# Patient Record
Sex: FEMALE | Race: WHITE | NOT HISPANIC OR LATINO | Employment: OTHER | URBAN - METROPOLITAN AREA
[De-identification: names, ages, dates, MRNs, and addresses within clinical notes are randomized per-mention and may not be internally consistent; named-entity substitution may affect disease eponyms.]

---

## 2014-12-24 LAB — CRC RECOMMENDATION EXT: NORMAL

## 2017-01-17 DIAGNOSIS — Z79.890 HORMONE REPLACEMENT THERAPY (HRT): ICD-10-CM

## 2017-01-19 RX ORDER — ESTRADIOL 0.5 MG/1
0.5 TABLET ORAL DAILY
Qty: 30 TABLET | Refills: 5 | Status: SHIPPED | OUTPATIENT
Start: 2017-01-19 | End: 2017-06-05 | Stop reason: SDUPTHER

## 2017-06-05 DIAGNOSIS — Z79.890 HORMONE REPLACEMENT THERAPY (HRT): ICD-10-CM

## 2017-06-06 RX ORDER — ESTRADIOL 0.5 MG/1
0.5 TABLET ORAL DAILY
Qty: 30 TABLET | Refills: 0 | Status: SHIPPED | OUTPATIENT
Start: 2017-06-06 | End: 2017-06-30 | Stop reason: SDUPTHER

## 2017-06-06 NOTE — TELEPHONE ENCOUNTER
Vicente pt requesting refill of estradiol. Due for her annual appt this month. Allergies and pharmacy are up to date.

## 2017-06-30 ENCOUNTER — TELEPHONE (OUTPATIENT)
Dept: OBSTETRICS AND GYNECOLOGY | Facility: CLINIC | Age: 67
End: 2017-06-30

## 2017-06-30 DIAGNOSIS — Z79.890 HORMONE REPLACEMENT THERAPY (HRT): ICD-10-CM

## 2017-06-30 DIAGNOSIS — Z12.31 ENCOUNTER FOR SCREENING MAMMOGRAM FOR MALIGNANT NEOPLASM OF BREAST: Primary | ICD-10-CM

## 2017-06-30 RX ORDER — ESTRADIOL 0.5 MG/1
TABLET ORAL
Qty: 30 TABLET | Refills: 0 | Status: SHIPPED | OUTPATIENT
Start: 2017-06-30 | End: 2018-04-12 | Stop reason: SDUPTHER

## 2018-04-12 DIAGNOSIS — Z79.890 HORMONE REPLACEMENT THERAPY (HRT): ICD-10-CM

## 2018-04-12 RX ORDER — ESTRADIOL 0.5 MG/1
TABLET ORAL
Qty: 30 TABLET | Refills: 1 | Status: SHIPPED | OUTPATIENT
Start: 2018-04-12 | End: 2018-06-07 | Stop reason: SDUPTHER

## 2018-06-07 ENCOUNTER — OFFICE VISIT (OUTPATIENT)
Dept: OBSTETRICS AND GYNECOLOGY | Facility: CLINIC | Age: 68
End: 2018-06-07
Payer: MEDICARE

## 2018-06-07 ENCOUNTER — APPOINTMENT (OUTPATIENT)
Dept: RADIOLOGY | Facility: OTHER | Age: 68
End: 2018-06-07
Attending: NURSE PRACTITIONER
Payer: MEDICARE

## 2018-06-07 VITALS
DIASTOLIC BLOOD PRESSURE: 72 MMHG | HEIGHT: 66 IN | WEIGHT: 166.56 LBS | BODY MASS INDEX: 26.77 KG/M2 | SYSTOLIC BLOOD PRESSURE: 120 MMHG

## 2018-06-07 DIAGNOSIS — Z12.39 SCREENING FOR BREAST CANCER: Primary | ICD-10-CM

## 2018-06-07 DIAGNOSIS — M85.80 OSTEOPENIA, UNSPECIFIED LOCATION: ICD-10-CM

## 2018-06-07 DIAGNOSIS — Z79.890 POSTMENOPAUSAL HRT (HORMONE REPLACEMENT THERAPY): ICD-10-CM

## 2018-06-07 DIAGNOSIS — M89.9 DISORDER OF BONE: ICD-10-CM

## 2018-06-07 DIAGNOSIS — Z79.890 HORMONE REPLACEMENT THERAPY (HRT): ICD-10-CM

## 2018-06-07 DIAGNOSIS — Z01.419 ENCOUNTER FOR GYNECOLOGICAL EXAMINATION: ICD-10-CM

## 2018-06-07 DIAGNOSIS — Z12.39 SCREENING FOR BREAST CANCER: ICD-10-CM

## 2018-06-07 PROCEDURE — 99999 PR PBB SHADOW E&M-EST. PATIENT-LVL IV: CPT | Mod: PBBFAC,,, | Performed by: NURSE PRACTITIONER

## 2018-06-07 PROCEDURE — 77067 SCR MAMMO BI INCL CAD: CPT | Mod: TC,PN

## 2018-06-07 PROCEDURE — G0101 CA SCREEN;PELVIC/BREAST EXAM: HCPCS | Mod: S$GLB,,, | Performed by: NURSE PRACTITIONER

## 2018-06-07 PROCEDURE — 77067 SCR MAMMO BI INCL CAD: CPT | Mod: 26,,, | Performed by: RADIOLOGY

## 2018-06-07 PROCEDURE — 77063 BREAST TOMOSYNTHESIS BI: CPT | Mod: 26,,, | Performed by: RADIOLOGY

## 2018-06-07 RX ORDER — ESTRADIOL 0.5 MG/1
TABLET ORAL
Qty: 30 TABLET | Refills: 11 | Status: SHIPPED | OUTPATIENT
Start: 2018-06-07 | End: 2018-12-28 | Stop reason: SDUPTHER

## 2018-06-07 NOTE — PROGRESS NOTES
Chief Complaint: Well Woman Exam    (Dr. Zhang patient)    Last Pap:  No result found HYST    Last Mammo:   (nml)  Last DEXA:  2014  (Left hip:  -1.5, spine not found in legacy documents)  Last Colonoscopy:  < 5yrs ago (nml per pt)    PCP:   at [facility]     Patient ID: Macy Mackay is a 67 y.o. female.    Chief Complaint:  Well Woman (Dr Zhang  hysterectomy  Last mammo  neg dexa  neg)      History of Present Illness.  Macy Mackay is a 67 y.o. female.  She has no breast or urinary symptoms.  She has no postcoital bleeding, pelvic pain or vaginal discharge.  Ms. Mackay is not currently sexually active.   States she had back surgery in 2016 after she fell and broke her back (T-11), and was told she had osteoporosis of spine by her surgeon.    GYN & OB History  No LMP recorded. Patient has had a hysterectomy.     OB History    Para Term  AB Living   6       3 3   SAB TAB Ectopic Multiple Live Births   3              # Outcome Date GA Lbr Antonino/2nd Weight Sex Delivery Anes PTL Lv   6             5             4             3 SAB            2 SAB            1 SAB                   Past Medical History:   Diagnosis Date    Anxiety     Breast disorder     Depression     Diabetes mellitus     Hyperlipidemia     Leiomyoma     Menopause     Osteopenia     Osteoporosis     in t section      Past Surgical History:   Procedure Laterality Date    ABDOMINAL SURGERY      gastric sleeve then gastric bypass then stomach reconstruction (after complications)    BREAST SURGERY      HYSTERECTOMY      neck and back surgeries      OOPHORECTOMY       Family History   Problem Relation Age of Onset    Eclampsia Maternal Grandmother     Diabetes Mother     Hypertension Mother     Stroke Mother     Diabetes Brother     Diabetes Sister     Stroke Maternal Uncle     Breast cancer Neg Hx     Colon cancer Neg Hx     Ovarian cancer Neg Hx      Social  "History   Substance Use Topics    Smoking status: Former Smoker    Smokeless tobacco: Never Used    Alcohol use No       Current Outpatient Prescriptions:     BD LUER-ROSA SYRINGE 3 mL 25 gauge x 1" Syrg, , Disp: , Rfl:     cyanocobalamin 1,000 mcg/mL injection, , Disp: , Rfl:     duloxetine (CYMBALTA) 60 MG capsule, , Disp: , Rfl:     DULOXETINE HCL (CYMBALTA ORAL), once a day, Disp: , Rfl:     estradiol (ESTRACE) 0.5 MG tablet, TAKE 1 TABLET ONE TIME DAILY, Disp: 30 tablet, Rfl: 11    ondansetron (ZOFRAN-ODT) 4 MG TbDL, , Disp: , Rfl:     oxycodone (ROXICODONE) 15 MG Tab, , Disp: , Rfl:     tretinoin (RETIN-A) 0.025 % cream, 1 application every evening. Apply to face, Disp: , Rfl: 3    Review of patient's allergies indicates:   Allergen Reactions    Demerol [meperidine] Hives       Review of Systems  GENERAL: Denies unintentional weight gain or weight loss. Feeling well overall.   SKIN: Denies rash or lesions.   HEENT: Denies headaches, or vision changes.   CARDIOVASCULAR: Denies palpitations or chest pain.   RESPIRATORY: Denies shortness of breath or dyspnea on exertion.  BREASTS: Denies pain, lumps, or nipple discharge.   ABDOMEN: Denies abdominal pain, constipation, diarrhea, nausea, vomiting, change in appetite.  URINARY: Denies frequency, dysuria, hematuria.  NEUROLOGIC: Denies syncope or weakness.   PSYCHIATRIC: Denies depression, anxiety or mood swings.  VULVAR: No pain, no lesions and no itching.  VAGINAL: No relaxation, no itching, no abnormal bleeding and no lesions.    Objective:     PHYSICAL EXAM:  /72   Ht 5' 6" (1.676 m)   Wt 75.5 kg (166 lb 8.9 oz)   BMI 26.88 kg/m²   Body mass index is 26.88 kg/m².     APPEARANCE: Well nourished, well developed, in no acute distress.  PSYCH: Appropriate mood and affect.  SKIN: No acne or hirsutism.  NECK: Neck symmetric without masses or thyromegaly  NODES: No inguinal, axillary, or supraclavicular lymph node enlargement  CHEST: Normal " respiratory effort.  ABDOMEN: Soft.  No tenderness or masses.    BREASTS: Symmetrical, no skin changes or visible lesions.  No palpable masses or nipple discharge bilaterally.  PELVIC: External genitalia: atrophic external genitalia, no erythema, no discharge  and urethra: normal appearing urethra with no masses, tenderness or lesions.  Normal hair distribution.  Vagina normal appearing vagina with normal color and discharge, no lesions, atrophic.  Cervix surgically absent.  No significant cystocele or rectocele.  Bimanual exam shows uterus to be surgically absent, vaginal cuff well healed.  Adnexa normal adnexa and no mass, fullness, tenderness.    EXTREMITIES: No edema.        Assessment/ Plan:     Screening for breast cancer  -     Cancel: Mammo Digital Screening Bilat with Tomosynthesis CAD; Future; Expected date: 06/07/2018  -     Mammo Digital Screening Bilat with Tomosynthesis CAD; Future; Expected date: 06/07/2018    Encounter for gynecological examination    Osteopenia, unspecified location  -     DXA Bone Density Spine And Hip; Future; Expected date: 06/07/2018    Disorder of bone   -     DXA Bone Density Spine And Hip; Future; Expected date: 06/07/2018    Postmenopausal HRT (hormone replacement therapy)    Hormone replacement therapy (HRT)  -     estradiol (ESTRACE) 0.5 MG tablet; TAKE 1 TABLET ONE TIME DAILY  Dispense: 30 tablet; Refill: 11        Patient was counseled today on current ASCCP pap guidelines, the recommendation for yearly pelvic exams, healthy diet and exercise routines, annual mammograms and breast self awareness, and colonoscopy screening. She is to see her PCP for other health maintenance, including yearly influenza vaccination.    Recommend calcium 1200 mg and vitamin D 600 units daily and routine bone mineral density testing every 2 years.    Use of the [facility] Patient Portal discussed and encouraged during today's visit.     Follow-up in about 1 year (around 6/7/2019) for Annual.

## 2018-06-08 ENCOUNTER — TELEPHONE (OUTPATIENT)
Dept: OBSTETRICS AND GYNECOLOGY | Facility: CLINIC | Age: 68
End: 2018-06-08

## 2018-06-08 NOTE — TELEPHONE ENCOUNTER
----- Message from Letty Green NP sent at 6/8/2018  7:01 AM CDT -----  Call patient mammogram normal

## 2018-06-15 ENCOUNTER — HOSPITAL ENCOUNTER (OUTPATIENT)
Dept: RADIOLOGY | Facility: CLINIC | Age: 68
Discharge: HOME | End: 2018-06-15
Attending: NURSE PRACTITIONER
Payer: MEDICARE

## 2018-06-15 DIAGNOSIS — M89.9 DISORDER OF BONE: ICD-10-CM

## 2018-06-15 DIAGNOSIS — M85.80 OSTEOPENIA, UNSPECIFIED LOCATION: ICD-10-CM

## 2018-06-15 PROCEDURE — 77080 DXA BONE DENSITY AXIAL: CPT | Mod: TC

## 2018-06-15 PROCEDURE — 77080 DXA BONE DENSITY AXIAL: CPT | Mod: 26,,, | Performed by: INTERNAL MEDICINE

## 2018-07-11 NOTE — PROGRESS NOTES
Hey - this patient of yours had WWE and DEXA done recently.  She reported a h/o Osteoporosis after repair of her spine (due to a fall). She is on Estrace PO daily, and the results show Osteopenia   (FRAX: 3.9 % and 19 %).  Is there anything you'd like to do differently to manage this?

## 2018-12-28 DIAGNOSIS — Z79.890 HORMONE REPLACEMENT THERAPY (HRT): ICD-10-CM

## 2018-12-28 RX ORDER — ESTRADIOL 0.5 MG/1
TABLET ORAL
Qty: 90 TABLET | Refills: 3 | Status: SHIPPED | OUTPATIENT
Start: 2018-12-28 | End: 2019-05-27 | Stop reason: SDUPTHER

## 2019-05-27 DIAGNOSIS — Z79.890 HORMONE REPLACEMENT THERAPY (HRT): ICD-10-CM

## 2019-06-03 RX ORDER — ESTRADIOL 0.5 MG/1
TABLET ORAL
Qty: 90 TABLET | Refills: 3 | Status: SHIPPED | OUTPATIENT
Start: 2019-06-03 | End: 2019-08-02 | Stop reason: SDUPTHER

## 2019-08-02 ENCOUNTER — OFFICE VISIT (OUTPATIENT)
Dept: OBSTETRICS AND GYNECOLOGY | Facility: CLINIC | Age: 69
End: 2019-08-02
Payer: MEDICARE

## 2019-08-02 VITALS
SYSTOLIC BLOOD PRESSURE: 120 MMHG | HEIGHT: 66 IN | WEIGHT: 149.69 LBS | BODY MASS INDEX: 24.06 KG/M2 | DIASTOLIC BLOOD PRESSURE: 60 MMHG

## 2019-08-02 DIAGNOSIS — Z79.890 HORMONE REPLACEMENT THERAPY (HRT): ICD-10-CM

## 2019-08-02 DIAGNOSIS — Z01.419 ENCOUNTER FOR GYNECOLOGICAL EXAMINATION: Primary | ICD-10-CM

## 2019-08-02 DIAGNOSIS — Z12.31 ENCOUNTER FOR SCREENING MAMMOGRAM FOR MALIGNANT NEOPLASM OF BREAST: ICD-10-CM

## 2019-08-02 PROCEDURE — 99999 PR PBB SHADOW E&M-EST. PATIENT-LVL III: CPT | Mod: PBBFAC,,, | Performed by: OBSTETRICS & GYNECOLOGY

## 2019-08-02 PROCEDURE — 99999 PR PBB SHADOW E&M-EST. PATIENT-LVL III: ICD-10-PCS | Mod: PBBFAC,,, | Performed by: OBSTETRICS & GYNECOLOGY

## 2019-08-02 PROCEDURE — G0101 PR CA SCREEN;PELVIC/BREAST EXAM: ICD-10-PCS | Mod: S$GLB,,, | Performed by: OBSTETRICS & GYNECOLOGY

## 2019-08-02 PROCEDURE — G0101 CA SCREEN;PELVIC/BREAST EXAM: HCPCS | Mod: S$GLB,,, | Performed by: OBSTETRICS & GYNECOLOGY

## 2019-08-02 RX ORDER — QUININE SULFATE 324 MG/1
CAPSULE ORAL
Refills: 6 | COMMUNITY
Start: 2019-07-26 | End: 2023-01-30 | Stop reason: SDUPTHER

## 2019-08-02 RX ORDER — FENTANYL 50 UG/1
PATCH TRANSDERMAL
Refills: 0 | COMMUNITY
Start: 2019-06-29

## 2019-08-02 RX ORDER — PREGABALIN 75 MG/1
75 CAPSULE ORAL 2 TIMES DAILY
Refills: 1 | COMMUNITY
Start: 2019-05-20 | End: 2022-09-19

## 2019-08-02 RX ORDER — LANOLIN ALCOHOL/MO/W.PET/CERES
1 CREAM (GRAM) TOPICAL DAILY
Refills: 6 | COMMUNITY
Start: 2019-05-12 | End: 2022-09-19

## 2019-08-02 RX ORDER — DULOXETIN HYDROCHLORIDE 30 MG/1
30 CAPSULE, DELAYED RELEASE ORAL NIGHTLY
Refills: 2 | COMMUNITY
Start: 2019-05-24 | End: 2022-09-19

## 2019-08-02 RX ORDER — BACLOFEN 10 MG/1
10 TABLET ORAL 3 TIMES DAILY
Refills: 1 | COMMUNITY
Start: 2019-07-24

## 2019-08-02 RX ORDER — ESTRADIOL 0.5 MG/1
TABLET ORAL
Qty: 90 TABLET | Refills: 3 | Status: SHIPPED | OUTPATIENT
Start: 2019-08-02 | End: 2023-11-06 | Stop reason: SDUPTHER

## 2019-08-02 NOTE — PROGRESS NOTES
"Subjective:       Patient ID: Macy Mackay is a 68 y.o. female.    Chief Complaint:  Well Woman (mammo 6/2018 bmd 6/2018 )      History of Present Illness  - here for annual. Has lump on left side of labia that thinks has gotten a little bigger. Doesn't hurt. No drainage. Doing well on HRT and wishes to continue.    Past Medical History:   Diagnosis Date    Anxiety     Breast disorder     Depression     Diabetes mellitus     Hyperlipidemia     Leiomyoma     Menopause     Osteopenia     Osteoporosis     in t section        Past Surgical History:   Procedure Laterality Date    ABDOMINAL SURGERY      gastric sleeve then gastric bypass then stomach reconstruction (after complications)    BREAST SURGERY      removed implants    HYSTERECTOMY      neck and back surgeries      OOPHORECTOMY           Current Outpatient Medications:     baclofen (LIORESAL) 10 MG tablet, Take 10 mg by mouth 3 (three) times daily., Disp: , Rfl: 1    BD LUER-ROSA SYRINGE 3 mL 25 gauge x 1" Syrg, , Disp: , Rfl:     cyanocobalamin 1,000 mcg/mL injection, , Disp: , Rfl:     DULoxetine (CYMBALTA) 30 MG capsule, Take 30 mg by mouth every evening., Disp: , Rfl: 2    duloxetine (CYMBALTA) 60 MG capsule, , Disp: , Rfl:     estradiol (ESTRACE) 0.5 MG tablet, TAKE 1 TABLET ONE TIME DAILY, Disp: 90 tablet, Rfl: 3    fentaNYL (DURAGESIC) 50 mcg/hr, APPLY 1 PATCH TRANSDERMALLY EVERY 72 HOURS, Disp: , Rfl: 0    LYRICA 75 mg capsule, Take 75 mg by mouth 2 (two) times daily., Disp: , Rfl: 1    magnesium oxide (MAG-OX) 400 mg (241.3 mg magnesium) tablet, Take 1 tablet by mouth once daily., Disp: , Rfl: 6    ondansetron (ZOFRAN-ODT) 4 MG TbDL, , Disp: , Rfl:     oxycodone (ROXICODONE) 15 MG Tab, , Disp: , Rfl:     quiNINE sulfate 324 mg Cap, TAKE 1 CAPSULE BY MOUTH EVERYDAY AT BEDTIME, Disp: , Rfl: 6    tretinoin (RETIN-A) 0.025 % cream, 1 application every evening. Apply to face, Disp: , Rfl: 3    Review of patient's allergies " indicates:   Allergen Reactions    Demerol [meperidine] Hives       GYN & OB History  No LMP recorded. Patient has had a hysterectomy.   Date of Last Pap: No result found    OB History    Para Term  AB Living   6 3 3   3 3   SAB TAB Ectopic Multiple Live Births   3       3      # Outcome Date GA Lbr Antonino/2nd Weight Sex Delivery Anes PTL Lv   6 Term      Vag-Spont   ALINA   5 Term      Vag-Spont   ALINA   4 Term      Vag-Spont   ALINA   3 SAB            2 SAB            1 SAB                Social History     Socioeconomic History    Marital status:      Spouse name: Not on file    Number of children: Not on file    Years of education: Not on file    Highest education level: Not on file   Occupational History    Not on file   Social Needs    Financial resource strain: Not on file    Food insecurity:     Worry: Not on file     Inability: Not on file    Transportation needs:     Medical: Not on file     Non-medical: Not on file   Tobacco Use    Smoking status: Former Smoker    Smokeless tobacco: Never Used   Substance and Sexual Activity    Alcohol use: No    Drug use: No    Sexual activity: Yes     Partners: Male     Birth control/protection: See Surgical Hx   Lifestyle    Physical activity:     Days per week: Not on file     Minutes per session: Not on file    Stress: Not on file   Relationships    Social connections:     Talks on phone: Not on file     Gets together: Not on file     Attends Zoroastrian service: Not on file     Active member of club or organization: Not on file     Attends meetings of clubs or organizations: Not on file     Relationship status: Not on file   Other Topics Concern    Not on file   Social History Narrative    Not on file       Family History   Problem Relation Age of Onset    Eclampsia Maternal Grandmother     Diabetes Mother     Hypertension Mother     Stroke Mother     Diabetes Brother     Diabetes Sister     Stroke Maternal Uncle     Breast  "cancer Neg Hx     Colon cancer Neg Hx     Ovarian cancer Neg Hx        Review of Systems  Review of Systems   Respiratory: Negative for shortness of breath.    Cardiovascular: Negative for chest pain and palpitations.   Gastrointestinal: Negative for blood in stool, nausea and vomiting.   Genitourinary:        - see HPI   Skin: Negative for rash and wound.   Allergic/Immunologic: Negative for immunocompromised state.   Neurological: Negative for dizziness and syncope.   Hematological: Negative for adenopathy.   Psychiatric/Behavioral: Negative for behavioral problems.        Objective:     Vitals:    08/02/19 1149   BP: 120/60   Weight: 67.9 kg (149 lb 11.1 oz)   Height: 5' 6" (1.676 m)       Physical Exam:   Constitutional: She is oriented to person, place, and time. She appears well-developed and well-nourished.        Pulmonary/Chest: Right breast exhibits no mass, no nipple discharge, no skin change, no tenderness and no swelling. Left breast exhibits no mass, no nipple discharge, no skin change, no tenderness and no swelling. Breasts are symmetrical.        Abdominal: Soft. She exhibits no distension. There is no tenderness.     Genitourinary: Vagina normal.       There is no tenderness or lesion on the right labia. There is no tenderness or lesion on the left labia. Uterus is absent. Right adnexum displays no mass, no tenderness and no fullness. Left adnexum displays no mass, no tenderness and no fullness. No vaginal discharge found. Cervix exhibits absence.   Genitourinary Comments: Tiny uninfected sebaceous cyst to left of clitoral estes. No s/s infection.           Musculoskeletal: Moves all extremeties.       Neurological: She is alert and oriented to person, place, and time.     Psychiatric: She has a normal mood and affect.        Assessment/ Plan:     Orders Placed This Encounter    Mammo Digital Screening Bilat w/ Alexander    estradiol (ESTRACE) 0.5 MG tablet       Macy was seen today for well " woman.    Diagnoses and all orders for this visit:    Encounter for gynecological examination    Encounter for screening mammogram for malignant neoplasm of breast  -     Mammo Digital Screening Bilat w/ Alexander; Future    Hormone replacement therapy (HRT)  -     estradiol (ESTRACE) 0.5 MG tablet; TAKE 1 TABLET ONE TIME DAILY    - reassured: no treatment needed for sebaceous cyst.    Follow up in about 2 years (around 8/2/2021).

## 2019-09-19 ENCOUNTER — TELEPHONE (OUTPATIENT)
Dept: OBSTETRICS AND GYNECOLOGY | Facility: CLINIC | Age: 69
End: 2019-09-19

## 2019-09-19 NOTE — TELEPHONE ENCOUNTER
Pt reports a discharge.  She doesn't know if she has a yeast infection or BV but just finished abx.  Denies itching.  Recommended an appt scheduled next Thursday with Letty.

## 2019-09-19 NOTE — TELEPHONE ENCOUNTER
Dr Zhang pt calling, states she has a vaginal discharge and would like something sent in.Pt # [phone] [facility] # [phone]

## 2019-09-26 ENCOUNTER — OFFICE VISIT (OUTPATIENT)
Dept: OBSTETRICS AND GYNECOLOGY | Facility: CLINIC | Age: 69
End: 2019-09-26
Payer: MEDICARE

## 2019-09-26 VITALS
BODY MASS INDEX: 24.84 KG/M2 | WEIGHT: 154.56 LBS | SYSTOLIC BLOOD PRESSURE: 112 MMHG | DIASTOLIC BLOOD PRESSURE: 72 MMHG | HEIGHT: 66 IN

## 2019-09-26 DIAGNOSIS — N76.0 BV (BACTERIAL VAGINOSIS): ICD-10-CM

## 2019-09-26 DIAGNOSIS — B96.89 BV (BACTERIAL VAGINOSIS): ICD-10-CM

## 2019-09-26 DIAGNOSIS — N89.8 VAGINAL DISCHARGE: Primary | ICD-10-CM

## 2019-09-26 PROBLEM — E88.810 METABOLIC SYNDROME: Status: ACTIVE | Noted: 2019-09-26

## 2019-09-26 PROBLEM — M72.0 DUPUYTREN'S DISEASE OF PALM: Status: ACTIVE | Noted: 2019-09-26

## 2019-09-26 PROBLEM — M51.9 LUMBAR DISC DISEASE: Status: ACTIVE | Noted: 2019-09-26

## 2019-09-26 PROBLEM — E11.9 TYPE 2 DIABETES MELLITUS: Status: ACTIVE | Noted: 2019-09-26

## 2019-09-26 PROBLEM — E78.9 LIPID DISORDER: Status: ACTIVE | Noted: 2019-09-26

## 2019-09-26 LAB
BACTERIA HYPHAE, POC: POSITIVE
GARDNERELLA VAGINALIS: NEGATIVE
OTHER MICROSC. OBSERVATIONS: NEGATIVE
POC BACTERIAL VAGINOSIS: NEGATIVE
POC CLUE CELLS: NEGATIVE
TRICHOMONAS, POC: NEGATIVE
YEAST WET PREP: NEGATIVE
YEAST, POC: NEGATIVE

## 2019-09-26 PROCEDURE — 87220 PR  TISSUE EXAM BY KOH: ICD-10-PCS | Mod: S$GLB,,, | Performed by: NURSE PRACTITIONER

## 2019-09-26 PROCEDURE — 99214 OFFICE O/P EST MOD 30 MIN: CPT | Mod: S$GLB,,, | Performed by: NURSE PRACTITIONER

## 2019-09-26 PROCEDURE — 99999 PR PBB SHADOW E&M-EST. PATIENT-LVL IV: ICD-10-PCS | Mod: PBBFAC,,, | Performed by: NURSE PRACTITIONER

## 2019-09-26 PROCEDURE — 87210 PR  SMEAR,STAIN,WET MNT,INTERP: ICD-10-PCS | Mod: QW,S$GLB,, | Performed by: NURSE PRACTITIONER

## 2019-09-26 PROCEDURE — 87220 TISSUE EXAM FOR FUNGI: CPT | Mod: S$GLB,,, | Performed by: NURSE PRACTITIONER

## 2019-09-26 PROCEDURE — 99999 PR PBB SHADOW E&M-EST. PATIENT-LVL IV: CPT | Mod: PBBFAC,,, | Performed by: NURSE PRACTITIONER

## 2019-09-26 PROCEDURE — 1101F PR PT FALLS ASSESS DOC 0-1 FALLS W/OUT INJ PAST YR: ICD-10-PCS | Mod: CPTII,S$GLB,, | Performed by: NURSE PRACTITIONER

## 2019-09-26 PROCEDURE — 1101F PT FALLS ASSESS-DOCD LE1/YR: CPT | Mod: CPTII,S$GLB,, | Performed by: NURSE PRACTITIONER

## 2019-09-26 PROCEDURE — 99214 PR OFFICE/OUTPT VISIT, EST, LEVL IV, 30-39 MIN: ICD-10-PCS | Mod: S$GLB,,, | Performed by: NURSE PRACTITIONER

## 2019-09-26 PROCEDURE — 87210 SMEAR WET MOUNT SALINE/INK: CPT | Mod: QW,S$GLB,, | Performed by: NURSE PRACTITIONER

## 2019-09-26 RX ORDER — AMOXICILLIN AND CLAVULANATE POTASSIUM 875; 125 MG/1; MG/1
TABLET, FILM COATED ORAL
Refills: 0 | COMMUNITY
Start: 2019-09-09 | End: 2019-09-26

## 2019-09-26 RX ORDER — IBUPROFEN 800 MG/1
800 TABLET ORAL EVERY 6 HOURS PRN
Refills: 0 | COMMUNITY
Start: 2019-09-03

## 2019-09-26 RX ORDER — HYDROCODONE BITARTRATE 15 MG/1
CAPSULE, EXTENDED RELEASE ORAL
COMMUNITY
Start: 2019-09-06 | End: 2022-09-19

## 2019-09-26 RX ORDER — ATORVASTATIN CALCIUM 40 MG/1
40 TABLET, FILM COATED ORAL
COMMUNITY
Start: 2019-09-06 | End: 2019-09-26

## 2019-09-26 RX ORDER — PENICILLIN V POTASSIUM 500 MG/1
TABLET, FILM COATED ORAL
Refills: 0 | COMMUNITY
Start: 2019-08-28 | End: 2019-09-26

## 2019-09-26 RX ORDER — ATORVASTATIN CALCIUM 40 MG/1
40 TABLET, FILM COATED ORAL NIGHTLY
Refills: 6 | COMMUNITY
Start: 2019-08-16 | End: 2022-09-19

## 2019-09-26 RX ORDER — METRONIDAZOLE 65 MG/5G
1 GEL TOPICAL NIGHTLY
Qty: 5 G | Refills: 0 | Status: SHIPPED | OUTPATIENT
Start: 2019-09-26 | End: 2019-09-27

## 2019-09-26 RX ORDER — PREDNISONE 10 MG/1
TABLET ORAL
Refills: 0 | COMMUNITY
Start: 2019-09-20 | End: 2022-09-19 | Stop reason: ALTCHOICE

## 2019-09-26 RX ORDER — ACETAMINOPHEN 500 MG
5 TABLET ORAL
COMMUNITY
Start: 2015-04-27

## 2019-09-27 NOTE — PROGRESS NOTES
"Chief Complaint:    Chief Complaint   Patient presents with    Vaginal Discharge     pt has been on antibiotics for several months to treat urinary symptoms         (Dr. Zhang patient)    Last Pap:   No result found      HPI:     Macy Mackay is a 69 y.o. female  presents with complaint of yellow vaginal discharge for past 1-2 months; denies odor or itch.   States she has been under the care of her urologist for cystitis, and was on oral antibiotics for several months.      She declines STD screening today.    LMP:  No LMP recorded. Patient has had a hysterectomy.    Past Medical History:   Diagnosis Date    Anxiety     Breast disorder     Depression     Diabetes mellitus     Hyperlipidemia     Leiomyoma     Menopause     Osteopenia     Osteoporosis     in t section        Past Surgical History:   Procedure Laterality Date    ABDOMINAL SURGERY      gastric sleeve then gastric bypass then stomach reconstruction (after complications)    BREAST SURGERY      removed implants    HYSTERECTOMY      neck and back surgeries      OOPHORECTOMY         OB History    Para Term  AB Living   6 3 3   3 3   SAB TAB Ectopic Multiple Live Births   3       3      # Outcome Date GA Lbr Antonino/2nd Weight Sex Delivery Anes PTL Lv   6 Term      Vag-Spont   ALINA   5 Term      Vag-Spont   ALINA   4 Term      Vag-Spont   ALINA   3 SAB            2 SAB            1 SAB                ROS:     GENERAL:  No fever, chills, fatigability or weight loss.  REPRODUCTIVE:  See HPI.  ABDOMEN:  No abdominal pain. Denies nausea. Denies vomiting. No diarrhea. No constipation.  URINARY:  No incontinence, no nocturia, no frequency and no dysuria.  CARDIOVASCULAR:  No chest pain. No shortness of breath. No leg cramps.  NEUROLOGICAL:  No headaches. No vision changes.    Physical Exam:     Vitals:    19 1310   BP: 112/72   Weight: 70.1 kg (154 lb 8.7 oz)   Height: 5' 6" (1.676 m)   PainSc: 0-No pain     Body mass index " is 24.94 kg/m².    GENERAL: No acute distress, alert and engaged  VULVA: normal appearing vulva mildly with no masses, tenderness or lesions.   VAGINA: normal appearing vagina with normal color and no lesions; small amt pale yellow discharge - KOH/WetPrep collected.   CERVIX: surgically absent.   UTERUS: surgically absent.   ADNEXA: surgically absesnt, non-tender and no masses.    Results:      KOH/Wet Prep results:  BV:   POS,  Candida:  neg,  Trichomonas:   neg       (See full results under LABS in Epic)    Assessment/Plan:     Vaginal discharge  -     POCT Wet Prep  -     POCT KOH    BV (bacterial vaginosis)  -     NUVESSA 1.3 % Gel; Place 1 Applicatorful vaginally nightly. BIN#: [id number],  PCN#: [id number],  GRP#: [id number], ID#: [id number] for 1 dose  Dispense: 5 g; Refill: 0    Counseling:     * Use of the [facility] Patient Portal discussed and encouraged during today's visit.     * Patient aware she will be notified of any results from today's visit once they have been reviewed by Provider, either via her [facility] patient portal, or telephone call.    Follow-up:  Follow up if symptoms worsen or fail to improve..

## 2020-05-18 ENCOUNTER — TELEPHONE (OUTPATIENT)
Dept: FAMILY MEDICINE | Facility: CLINIC | Age: 70
End: 2020-05-18

## 2020-05-18 NOTE — TELEPHONE ENCOUNTER
----- Message from Rosy Hernandez sent at 5/18/2020 10:02 AM CDT -----  Contact: patient  Type:  Needs Medical Advice    Who Called:  patient  Symptoms (please be specific):  100.3 fever, constipation, cramping   How long has patient had these symptoms:   Since Friday morning  Pharmacy name and phone #:  n/a  Would the patient rather a call back or a response via [facility]? Call back  Best Call Back Number:  [phone]  Additional Information:  Please call today to discuss since is not getting better

## 2020-05-18 NOTE — TELEPHONE ENCOUNTER
Mailbox full, could not leave message.  Patient is not established with Dr Jarrett.  Patient can be scheduled for a virtual visit with any provider that has availability.

## 2022-09-12 ENCOUNTER — TELEPHONE (OUTPATIENT)
Dept: FAMILY MEDICINE | Facility: CLINIC | Age: 72
End: 2022-09-12
Payer: MEDICARE

## 2022-09-12 NOTE — TELEPHONE ENCOUNTER
Patient has a scheduled appointment with Dr. Gottlieb on 9/19/2022 @ 2PM. And she wants the office staff to know please let Dr. Gottlieb review her Mri results.  Call placed to patient due to message left for return call regarding message left. Currently, patient is not available, message left for return call.    ----- Message from Na Lee sent at 9/12/2022  2:57 PM CDT -----  Contact: pt  Type: Needs Medical Advice         Who Called:pt  Best Call Back Number:[phone]  Additional Information: Requesting a call back regarding t is having her MRI results sent over to the office and wanted to ensure office knew .    Please Advise- Thank you

## 2022-09-12 NOTE — TELEPHONE ENCOUNTER
Follow call placed to patient due to return call. Patient states she will physically bring a copy of her Mri results for Dr. Gottlieb to review on schedule appt.    ----- Message from Angie Maria sent at 9/12/2022  3:34 PM CDT -----  Contact: PT  Type:  Patient Returning Call    Who Called:  Pt  Who Left Message for Patient:  Jamie  Does the patient know what this is regarding?:  yes  Best Call Back Number:  [phone]  Additional Information:

## 2022-09-19 ENCOUNTER — OFFICE VISIT (OUTPATIENT)
Dept: FAMILY MEDICINE | Facility: CLINIC | Age: 72
End: 2022-09-19
Payer: MEDICARE

## 2022-09-19 VITALS
TEMPERATURE: 98 F | DIASTOLIC BLOOD PRESSURE: 78 MMHG | OXYGEN SATURATION: 97 % | BODY MASS INDEX: 19.09 KG/M2 | HEART RATE: 86 BPM | SYSTOLIC BLOOD PRESSURE: 130 MMHG | HEIGHT: 66 IN | WEIGHT: 118.81 LBS

## 2022-09-19 DIAGNOSIS — F41.9 ANXIETY: ICD-10-CM

## 2022-09-19 DIAGNOSIS — M72.0 DUPUYTREN'S DISEASE OF PALM: Primary | ICD-10-CM

## 2022-09-19 DIAGNOSIS — Z98.84 BARIATRIC SURGERY STATUS: ICD-10-CM

## 2022-09-19 DIAGNOSIS — E11.9 TYPE 2 DIABETES MELLITUS WITHOUT COMPLICATION, WITHOUT LONG-TERM CURRENT USE OF INSULIN: ICD-10-CM

## 2022-09-19 DIAGNOSIS — M25.531 RIGHT WRIST PAIN: ICD-10-CM

## 2022-09-19 PROBLEM — K21.9 GASTROESOPHAGEAL REFLUX DISEASE: Status: ACTIVE | Noted: 2022-09-19

## 2022-09-19 PROCEDURE — 1125F PR PAIN SEVERITY QUANTIFIED, PAIN PRESENT: ICD-10-PCS | Mod: CPTII,S$GLB,, | Performed by: FAMILY MEDICINE

## 2022-09-19 PROCEDURE — 1159F PR MEDICATION LIST DOCUMENTED IN MEDICAL RECORD: ICD-10-PCS | Mod: CPTII,S$GLB,, | Performed by: FAMILY MEDICINE

## 2022-09-19 PROCEDURE — 3078F DIAST BP <80 MM HG: CPT | Mod: CPTII,S$GLB,, | Performed by: FAMILY MEDICINE

## 2022-09-19 PROCEDURE — 1125F AMNT PAIN NOTED PAIN PRSNT: CPT | Mod: CPTII,S$GLB,, | Performed by: FAMILY MEDICINE

## 2022-09-19 PROCEDURE — 1159F MED LIST DOCD IN RCRD: CPT | Mod: CPTII,S$GLB,, | Performed by: FAMILY MEDICINE

## 2022-09-19 PROCEDURE — 3288F PR FALLS RISK ASSESSMENT DOCUMENTED: ICD-10-PCS | Mod: CPTII,S$GLB,, | Performed by: FAMILY MEDICINE

## 2022-09-19 PROCEDURE — 3008F BODY MASS INDEX DOCD: CPT | Mod: CPTII,S$GLB,, | Performed by: FAMILY MEDICINE

## 2022-09-19 PROCEDURE — 3075F PR MOST RECENT SYSTOLIC BLOOD PRESS GE 130-139MM HG: ICD-10-PCS | Mod: CPTII,S$GLB,, | Performed by: FAMILY MEDICINE

## 2022-09-19 PROCEDURE — 1101F PT FALLS ASSESS-DOCD LE1/YR: CPT | Mod: CPTII,S$GLB,, | Performed by: FAMILY MEDICINE

## 2022-09-19 PROCEDURE — 3075F SYST BP GE 130 - 139MM HG: CPT | Mod: CPTII,S$GLB,, | Performed by: FAMILY MEDICINE

## 2022-09-19 PROCEDURE — 3008F PR BODY MASS INDEX (BMI) DOCUMENTED: ICD-10-PCS | Mod: CPTII,S$GLB,, | Performed by: FAMILY MEDICINE

## 2022-09-19 PROCEDURE — 99214 OFFICE O/P EST MOD 30 MIN: CPT | Mod: S$GLB,,, | Performed by: FAMILY MEDICINE

## 2022-09-19 PROCEDURE — 1101F PR PT FALLS ASSESS DOC 0-1 FALLS W/OUT INJ PAST YR: ICD-10-PCS | Mod: CPTII,S$GLB,, | Performed by: FAMILY MEDICINE

## 2022-09-19 PROCEDURE — 99214 PR OFFICE/OUTPT VISIT, EST, LEVL IV, 30-39 MIN: ICD-10-PCS | Mod: S$GLB,,, | Performed by: FAMILY MEDICINE

## 2022-09-19 PROCEDURE — 3078F PR MOST RECENT DIASTOLIC BLOOD PRESSURE < 80 MM HG: ICD-10-PCS | Mod: CPTII,S$GLB,, | Performed by: FAMILY MEDICINE

## 2022-09-19 PROCEDURE — 3288F FALL RISK ASSESSMENT DOCD: CPT | Mod: CPTII,S$GLB,, | Performed by: FAMILY MEDICINE

## 2022-09-19 RX ORDER — PANTOPRAZOLE SODIUM 40 MG/1
40 TABLET, DELAYED RELEASE ORAL DAILY
Qty: 30 TABLET | Refills: 11 | Status: SHIPPED | OUTPATIENT
Start: 2022-09-19 | End: 2023-09-19

## 2022-09-19 RX ORDER — PRIMIDONE 250 MG/1
250 TABLET ORAL 3 TIMES DAILY
COMMUNITY

## 2022-09-19 NOTE — PROGRESS NOTES
"Family Medicine Note  [facility] [place]    Chief Complaint   Patient presents with    Follow-up        HPI:  72 female well known to me from [place], [place] here to establish care.    Has Dupuytren's disease,which tends to be responsible for a fair amount of her chronic issues    Diabetes controlled with A1C at goal on diet  Depression/anxiety stable on medication    Has moved to [place] after loss of .    Has worsening cyst/connective tissue to dorsum of R wrist - aspirate negative for ganglion cyst, and pain is markedly worsening    Had bariatric surgery in distant past - full gastric bypass    ROS: +wrist pain    Vitals:    09/19/22 1414   BP: 130/78   BP Location: Left arm   Patient Position: Sitting   BP Method: Medium (Manual)   Pulse: 86   Temp: 97.7 °F (36.5 °C)   TempSrc: Temporal   SpO2: 97%   Weight: 53.9 kg (118 lb 12.8 oz)   Height: 5' 6" (1.676 m)      Body mass index is 19.17 kg/m².      General:  AOx3, well nourished and developed in no acute distress  Eyes:  PERRLA, EOMI, vision intact grossly  ENT:  normal hearing, moist oral mucosa  Neck:  trachea midline with no masses or thyromegaly  Heart:  RRR, no murmurs.  No edema noted, extremities warm and well perfused  Lungs:  clear to auscultation bilaterally with symmetric chest movement  Abdomen:  Soft, nontender, nondistended.  Normal bowel sounds  Musculoskeletal:  Normal gait.  Normal posture.  Normal muscular development with no joint swelling, but notable swelling to dorsum of wrist  Neurological:  CN II-XII grossly intact. Symmetric strength and sensation  Psych:  Normal mood and affect.  Able to demonstrate good judgement and personal insight.      Assessment/Plan:    Dupuytren's disease of palm    Type 2 diabetes mellitus without complication, without long-term current use of insulin    Anxiety    Right wrist pain    Bariatric surgery status       Stable, but continue pain management  Stable, get bloodwork " next visit  Stable on medication  Needs surgical referral, will send message to dr. Mascorro  Check bloodwork (iron, B12, vit d) next visit    Start medication for GERD

## 2022-09-19 NOTE — PATIENT INSTRUCTIONS
Start medication for stomach pain  Placed urgent referral to Dr. Mascorro    Follow up 3 months, will check bloodwork next visit

## 2022-09-19 NOTE — LETTER
September 19, 2022        Ad Mascorro, DO  [address] [place] [address]             [facility]  [address] [place] [address]  Phone: [phone]  Fax: [phone]   Patient: Macy Mackay   MR Number: [id number]   YOB: 1950   Date of Visit: 9/19/2022       Dear Dr. Mascorro:    I'm sending Macy to you for further evaluation of her wrist pain.  She has a worsening cystic lesion to her R wrist causing a fair amount of pain.  Previous attempts at aspiration have ruled out ganglion cyst.  She does have underlying Dupuytren'ts disease.          If you have questions, please do not hesitate to call me. I look forward to following Macy along with you.    Sincerely,      Jose Miguel Gottlieb MD           CC  No Recipients

## 2022-09-21 DIAGNOSIS — Z78.0 MENOPAUSE: ICD-10-CM

## 2022-10-03 DIAGNOSIS — M25.531 RIGHT WRIST PAIN: Primary | ICD-10-CM

## 2022-10-19 ENCOUNTER — OFFICE VISIT (OUTPATIENT)
Dept: ORTHOPEDICS | Facility: CLINIC | Age: 72
End: 2022-10-19
Payer: MEDICARE

## 2022-10-19 ENCOUNTER — HOSPITAL ENCOUNTER (OUTPATIENT)
Dept: RADIOLOGY | Facility: HOSPITAL | Age: 72
Discharge: HOME | End: 2022-10-19
Attending: ORTHOPAEDIC SURGERY
Payer: MEDICARE

## 2022-10-19 ENCOUNTER — TELEPHONE (OUTPATIENT)
Dept: ORTHOPEDICS | Facility: CLINIC | Age: 72
End: 2022-10-19

## 2022-10-19 VITALS — WEIGHT: 118.81 LBS | HEIGHT: 66 IN | BODY MASS INDEX: 19.09 KG/M2 | RESPIRATION RATE: 16 BRPM

## 2022-10-19 DIAGNOSIS — M18.11 ARTHRITIS OF CARPOMETACARPAL (CMC) JOINT OF RIGHT THUMB: ICD-10-CM

## 2022-10-19 DIAGNOSIS — D48.19: Primary | ICD-10-CM

## 2022-10-19 DIAGNOSIS — R60.0 LOCALIZED EDEMA: ICD-10-CM

## 2022-10-19 DIAGNOSIS — Z01.818 PREOP TESTING: ICD-10-CM

## 2022-10-19 DIAGNOSIS — M25.531 RIGHT WRIST PAIN: ICD-10-CM

## 2022-10-19 PROCEDURE — 99999 PR PBB SHADOW E&M-EST. PATIENT-LVL III: CPT | Mod: PBBFAC,,, | Performed by: ORTHOPAEDIC SURGERY

## 2022-10-19 PROCEDURE — 99999 PR PBB SHADOW E&M-EST. PATIENT-LVL III: ICD-10-PCS | Mod: PBBFAC,,, | Performed by: ORTHOPAEDIC SURGERY

## 2022-10-19 PROCEDURE — 99204 PR OFFICE/OUTPT VISIT, NEW, LEVL IV, 45-59 MIN: ICD-10-PCS | Mod: 57,S$PBB,, | Performed by: ORTHOPAEDIC SURGERY

## 2022-10-19 PROCEDURE — 73110 XR WRIST COMPLETE 3 VIEWS RIGHT: ICD-10-PCS | Mod: 26,RT,, | Performed by: RADIOLOGY

## 2022-10-19 PROCEDURE — 99213 OFFICE O/P EST LOW 20 MIN: CPT | Mod: PBBFAC | Performed by: ORTHOPAEDIC SURGERY

## 2022-10-19 PROCEDURE — 99204 OFFICE O/P NEW MOD 45 MIN: CPT | Mod: 57,S$PBB,, | Performed by: ORTHOPAEDIC SURGERY

## 2022-10-19 PROCEDURE — 73110 X-RAY EXAM OF WRIST: CPT | Mod: 26,RT,, | Performed by: RADIOLOGY

## 2022-10-19 PROCEDURE — 73110 X-RAY EXAM OF WRIST: CPT | Mod: TC,RT

## 2022-10-19 NOTE — TELEPHONE ENCOUNTER
Returned call. Pre-op appointment were rescheduled to later in the day for the patient. Patient was agreeable to new times.    ----- Message from Latasha Parsons MA sent at 10/19/2022  4:06 PM CDT -----  Contact: pt  States needs to reschedule pre op appts   Call back

## 2022-10-19 NOTE — PROGRESS NOTES
Subjective:      Patient ID: Macy Mackay is a 72 y.o. female.    Chief Complaint: Pain and Mass of the Right Wrist    Referring Provider: Christi Self  No address on file    HPI:  Ms. Mackay is a 72-year-old right-hand-dominant lady who presented today for evaluation of painful swelling at the basilar aspect of the right thumb which began approximately 2 years ago and increased in intensity over the last 2 months after she had an aspiration completed by another orthopedic surgeon in [place].  She stated, it just got there .  She denied trauma to her wrist.  Now writing, driving, and gripping items increases her symptoms while nothing seems to improve them.  She has concerned because is progressively increasing in size.  Now when she dorsiflexes her thumb the pain radiates to her elbow.  She has not taken NSAIDs, worn a brace, nor had injections.    Past Medical History:   Diagnosis Date    Anxiety     Breast disorder     Depression     Diabetes mellitus     Hyperlipidemia     Leiomyoma     Menopause     Osteopenia     Osteoporosis    *  *  *  * Chronic low back pain  Dupuytren's disease  Essential tremor   Chronic pain managed     Past Surgical History:   Procedure Laterality Date    GASTRIC BYPASS WITH GASTRIC SLEEVE     * LUMBAR DECOMPRESSION    BILATERAL BREAST AUGMENTATION     * REMOVAL BILATERAL BREAST IMPLANT    HYSTERECTOMY WITH OOPHORECTOMY      C-SPINE FUSION       *  *  *  *  *  *  *  *  * LUMBAR FUSION  APPENDECTOMY  CHOLECYSTECTOMY  TUBAL LIGATION   RIGHT BREAST BIOPSY  COLONOSCOPY  BILATERAL CATARACT EXCISION   SINUS SURGERY   FASCIECTOMY BILATERAL HAND  ORAL SURGERY         Review of patient's allergies indicates:   Allergen Reactions    Demerol [meperidine] Hives       Social History     Occupational History    Retired construction business owner   Tobacco Use    Smoking status: Former    Smokeless tobacco: Never   Substance and Sexual Activity    Alcohol use: No    Drug use: No     Sexual activity: Yes     Partners: Male     Birth control/protection: See Surgical Hx      Family History   Problem Relation Age of Onset    Eclampsia Maternal Grandmother     Diabetes Mother     Hypertension Mother     Stroke Mother     Diabetes Brother     Diabetes Sister     Stroke Maternal Uncle     Breast cancer Neg Hx     Colon cancer Neg Hx     Ovarian cancer Neg Hx        Previous Hospitalizations:  Childbirth, C-spine fusion, gastric bypass, lumbar fusion     ROS:   Review of Systems   Constitutional: Negative for chills and fever.   HENT:  Negative for congestion.    Eyes:  Negative for blurred vision and double vision.   Cardiovascular:  Negative for chest pain and cyanosis.   Respiratory:  Negative for cough and shortness of breath.    Endocrine: Negative for polydipsia.   Hematologic/Lymphatic: Negative for adenopathy.   Skin:  Negative for flushing, itching and skin cancer.   Musculoskeletal:  Positive for back pain and neck pain. Negative for gout.   Gastrointestinal:  Negative for constipation, diarrhea and heartburn.   Genitourinary:  Negative for nocturia.   Neurological:  Positive for tremors. Negative for headaches and seizures.   Psychiatric/Behavioral:  Positive for depression. Negative for substance abuse. The patient is nervous/anxious.    Allergic/Immunologic: Negative for environmental allergies.         Objective:      Physical Exam:   General: AAOx3.  No acute distress  HEENT: Normocephalic, PEARLA EOMI, permanent denture  Neck: Supple, No JVD  Chest: Symetric, equal excursion on inspiration  Abdomen: Soft NTND  Vascular:  Pulses intact and equal bilaterally.  Capillary refill less than 3 seconds and equal bilaterally  Neurologic:  Pinprick and soft touch intact and equal bilaterally  Integment:  No ecchymosis, no errythema  Extremity:  Wrist/hand:  Pronation/supination equal bilaterally 90/85 degrees.  Dorsiflexion/volar flexion right wrist 45/60 degrees, left wrist 70/60 degrees.  Mild  tenderness in the anatomic snuffbox right wrist.  Swelling at the 1st CMC joint dorsally right wrist.  Nontender at the 1st dorsal compartment bilaterally.  No swelling at the 1st dorsal compartment bilaterally.  Finkelstein's negative bilaterally.  Grind test positive right thumb.  Nontender at the scapholunate interval bilaterally.  No swelling at the scapholunate interval bilaterally.  Lowry's test negative bilaterally.  Nontender at the DRUJ/TFCC bilaterally.  Ulnar impaction test negative bilaterally.  Wartenberg sign negative bilaterally.  Mild flexion contracture small finger right hand good motion of the fingers of both hands.  Mild decreased dorsiflexion right thumb secondary to tenderness and mass.  Durkan's test negative bilaterally  Radiography:  Personally reviewed x-rays of the right wrist completed on 10/19/2022 showed 1st CMC arthritic changes.      Assessment:       Impression:      1. Tenosynovial giant cell tumor, radial aspect right wrist   2. Arthritis of carpometacarpal (CMC) joint of right thumb    3. Right wrist pain          Plan:       1.  Discussed physical examination and radiographic findings with the patient. Macy understands that she appears to have a giant cell tumor of the 1st CMC joint of her right thumb.  She also has 1st CMC arthritis.  Also discussed with the patient that since she had a previous aspiration which did not draw all out any fluid is most likely not a ganglion cyst but it could also be synovitis.  Treatment alternatives and outcomes were discussed with the patient she understands she could continue with conservative management such as observation, activity modification, NSAIDs, bracing, physical therapy, injections, or she could consider surgical intervention such as excision mass.  She states she would prefer to proceed with surgery and have the mass excised from her wrist.  2. Possible complications of surgery to include bleeding, infection, scarring,  nerve/blood vessel/tendon damage, need for further surgery, failed surgery, failure to improve, possible persistent pain, possible arthritis, possible arthrofibrosis, and possible recurrence were discussed with the patient.  The patient was permitted to ask questions and all concerns were addressed to her satisfaction.  3. Consent for excision mass, right wrist.  4. Tentatively schedule surgery for 11/10/2022.    5. She understands for postoperative pain management she needs to discuss it with her pain management physician.    6. May take over-the-counter NSAIDs dosed per box instructions she understands she should talk with her PCM about the medications before taking NSAIDs.    7. Follow up 10-12 days postoperatively.

## 2022-10-19 NOTE — H&P
Chief Complaint: Pain and Mass of the Right Wrist    HPI:  Ms. Mackay is a 72-year-old right-hand-dominant lady who presented today for evaluation of painful swelling at the basilar aspect of the right thumb which began approximately 2 years ago and increased in intensity over the last 2 months after she had an aspiration completed by another orthopedic surgeon in [place].  She stated, it just got there .  She denied trauma to her wrist.  Now writing, driving, and gripping items increases her symptoms while nothing seems to improve them.  She has concerned because is progressively increasing in size.  Now when she dorsiflexes her thumb the pain radiates to her elbow.  She has not taken NSAIDs, worn a brace, nor had injections.    Past Medical History:   Diagnosis Date    Anxiety     Breast disorder     Depression     Diabetes mellitus     Hyperlipidemia     Leiomyoma     Menopause     Osteopenia     Osteoporosis    *  *  *  * Chronic low back pain  Dupuytren's disease  Essential tremor   Chronic pain managed     Past Surgical History:   Procedure Laterality Date    GASTRIC BYPASS WITH GASTRIC SLEEVE     * LUMBAR DECOMPRESSION    BILATERAL BREAST AUGMENTATION     * REMOVAL BILATERAL BREAST IMPLANT    HYSTERECTOMY WITH OOPHORECTOMY      C-SPINE FUSION       *  *  *  *  *  *  *  *  * LUMBAR FUSION  APPENDECTOMY  CHOLECYSTECTOMY  TUBAL LIGATION   RIGHT BREAST BIOPSY  COLONOSCOPY  BILATERAL CATARACT EXCISION   SINUS SURGERY   FASCIECTOMY BILATERAL HAND  ORAL SURGERY         Review of patient's allergies indicates:   Allergen Reactions    Demerol [meperidine] Hives       Social History     Occupational History    Retired construction business owner   Tobacco Use    Smoking status: Former    Smokeless tobacco: Never   Substance and Sexual Activity    Alcohol use: No    Drug use: No    Sexual activity: Yes     Partners: Male     Birth control/protection: See Surgical Hx      Family History   Problem Relation Age of  Onset    Eclampsia Maternal Grandmother     Diabetes Mother     Hypertension Mother     Stroke Mother     Diabetes Brother     Diabetes Sister     Stroke Maternal Uncle     Breast cancer Neg Hx     Colon cancer Neg Hx     Ovarian cancer Neg Hx        Previous Hospitalizations:  Childbirth, C-spine fusion, gastric bypass, lumbar fusion     ROS:   Review of Systems   Constitutional: Negative for chills and fever.   HENT:  Negative for congestion.    Eyes:  Negative for blurred vision and double vision.   Cardiovascular:  Negative for chest pain and cyanosis.   Respiratory:  Negative for cough and shortness of breath.    Endocrine: Negative for polydipsia.   Hematologic/Lymphatic: Negative for adenopathy.   Skin:  Negative for flushing, itching and skin cancer.   Musculoskeletal:  Positive for back pain and neck pain. Negative for gout.   Gastrointestinal:  Negative for constipation, diarrhea and heartburn.   Genitourinary:  Negative for nocturia.   Neurological:  Positive for tremors. Negative for headaches and seizures.   Psychiatric/Behavioral:  Positive for depression. Negative for substance abuse. The patient is nervous/anxious.    Allergic/Immunologic: Negative for environmental allergies.         Objective:      Physical Exam:   General: AAOx3.  No acute distress  HEENT: Normocephalic, PEARLA EOMI, permanent denture  Neck: Supple, No JVD  Chest: Symetric, equal excursion on inspiration  Abdomen: Soft NTND  Vascular:  Pulses intact and equal bilaterally.  Capillary refill less than 3 seconds and equal bilaterally  Neurologic:  Pinprick and soft touch intact and equal bilaterally  Integment:  No ecchymosis, no errythema  Extremity:  Wrist/hand:  Pronation/supination equal bilaterally 90/85 degrees.  Dorsiflexion/volar flexion right wrist 45/60 degrees, left wrist 70/60 degrees.  Mild tenderness in the anatomic snuffbox right wrist.  Swelling at the 1st CMC joint dorsally right wrist.  Nontender at the 1st dorsal  compartment bilaterally.  No swelling at the 1st dorsal compartment bilaterally.  Finkelstein's negative bilaterally.  Grind test positive right thumb.  Nontender at the scapholunate interval bilaterally.  No swelling at the scapholunate interval bilaterally.  Lowry's test negative bilaterally.  Nontender at the DRUJ/TFCC bilaterally.  Ulnar impaction test negative bilaterally.  Wartenberg sign negative bilaterally.  Mild flexion contracture small finger right hand good motion of the fingers of both hands.  Mild decreased dorsiflexion right thumb secondary to tenderness and mass.  Durkan's test negative bilaterally  Radiography:  Personally reviewed x-rays of the right wrist completed on 10/19/2022 showed 1st CMC arthritic changes.      Assessment:       Impression:      1. Tenosynovial giant cell tumor, radial aspect right wrist   2. Arthritis of carpometacarpal (CMC) joint of right thumb    3. Right wrist pain          Plan:       1.  Discussed physical examination and radiographic findings with the patient. Macy understands that she appears to have a giant cell tumor of the 1st CMC joint of her right thumb.  She also has 1st CMC arthritis.  Also discussed with the patient that since she had a previous aspiration which did not draw all out any fluid is most likely not a ganglion cyst but it could also be synovitis.  Treatment alternatives and outcomes were discussed with the patient she understands she could continue with conservative management such as observation, activity modification, NSAIDs, bracing, physical therapy, injections, or she could consider surgical intervention such as excision mass.  She states she would prefer to proceed with surgery and have the mass excised from her wrist.  2. Possible complications of surgery to include bleeding, infection, scarring, nerve/blood vessel/tendon damage, need for further surgery, failed surgery, failure to improve, possible persistent pain, possible  arthritis, possible arthrofibrosis, and possible recurrence were discussed with the patient.  The patient was permitted to ask questions and all concerns were addressed to her satisfaction.  3. Consent for excision mass, right wrist.  4. Tentatively schedule surgery for 11/10/2022.    5. She understands for postoperative pain management she needs to discuss it with her pain management physician.    6. May take over-the-counter NSAIDs dosed per box instructions she understands she should talk with her PCM about the medications before taking NSAIDs.    7. Follow up 10-12 days postoperatively.

## 2022-11-01 ENCOUNTER — ANESTHESIA EVENT (OUTPATIENT)
Dept: SURGERY | Facility: HOSPITAL | Age: 72
End: 2022-11-01
Payer: MEDICARE

## 2022-11-01 ENCOUNTER — HOSPITAL ENCOUNTER (OUTPATIENT)
Dept: PREADMISSION TESTING | Facility: HOSPITAL | Age: 72
Discharge: HOME | End: 2022-11-01
Attending: ORTHOPAEDIC SURGERY
Payer: MEDICARE

## 2022-11-04 ENCOUNTER — TELEPHONE (OUTPATIENT)
Dept: ORTHOPEDICS | Facility: CLINIC | Age: 72
End: 2022-11-04
Payer: MEDICARE

## 2022-11-04 NOTE — TELEPHONE ENCOUNTER
Returned call. [facility] for patient to call back so that our office could address what she needs for pain management.  ----- Message -----  From: Latasha Parsons MA  Sent: 11/3/2022   1:24 PM CDT  To: Sub[facility] Duong Staff    Questions on upcoming surgery   In pain management,  needs fax to Ad Herrera MD   Fax   He needs to be able to do her meds   Juan José back

## 2022-11-10 ENCOUNTER — HOSPITAL ENCOUNTER (OUTPATIENT)
Facility: HOSPITAL | Age: 72
Discharge: HOME | End: 2022-11-10
Attending: ORTHOPAEDIC SURGERY | Admitting: ORTHOPAEDIC SURGERY
Payer: MEDICARE

## 2022-11-10 ENCOUNTER — ANESTHESIA (OUTPATIENT)
Dept: SURGERY | Facility: HOSPITAL | Age: 72
End: 2022-11-10
Payer: MEDICARE

## 2022-11-10 DIAGNOSIS — D48.19: Primary | ICD-10-CM

## 2022-11-10 PROCEDURE — 37000008 HC ANESTHESIA 1ST 15 MINUTES: Performed by: ORTHOPAEDIC SURGERY

## 2022-11-10 PROCEDURE — D9220A PRA ANESTHESIA: Mod: CRNA,,, | Performed by: NURSE ANESTHETIST, CERTIFIED REGISTERED

## 2022-11-10 PROCEDURE — 88307 TISSUE EXAM BY PATHOLOGIST: CPT | Mod: 26,,, | Performed by: PATHOLOGY

## 2022-11-10 PROCEDURE — 25000003 PHARM REV CODE 250: Performed by: ANESTHESIOLOGY

## 2022-11-10 PROCEDURE — 88307 PR  SURG PATH,LEVEL V: ICD-10-PCS | Mod: 26,,, | Performed by: PATHOLOGY

## 2022-11-10 PROCEDURE — 88307 TISSUE EXAM BY PATHOLOGIST: CPT | Performed by: PATHOLOGY

## 2022-11-10 PROCEDURE — 63600175 PHARM REV CODE 636 W HCPCS: Performed by: ANESTHESIOLOGY

## 2022-11-10 PROCEDURE — 63600175 PHARM REV CODE 636 W HCPCS: Performed by: NURSE ANESTHETIST, CERTIFIED REGISTERED

## 2022-11-10 PROCEDURE — 25000003 PHARM REV CODE 250: Performed by: ORTHOPAEDIC SURGERY

## 2022-11-10 PROCEDURE — 25071 EXC FOREARM LES SC 3 CM/>: CPT | Mod: RT,,, | Performed by: ORTHOPAEDIC SURGERY

## 2022-11-10 PROCEDURE — D9220A PRA ANESTHESIA: ICD-10-PCS | Mod: ANES,,, | Performed by: ANESTHESIOLOGY

## 2022-11-10 PROCEDURE — 37000009 HC ANESTHESIA EA ADD 15 MINS: Performed by: ORTHOPAEDIC SURGERY

## 2022-11-10 PROCEDURE — 36000707: Performed by: ORTHOPAEDIC SURGERY

## 2022-11-10 PROCEDURE — 71000033 HC RECOVERY, INTIAL HOUR: Performed by: ORTHOPAEDIC SURGERY

## 2022-11-10 PROCEDURE — D9220A PRA ANESTHESIA: Mod: ANES,,, | Performed by: ANESTHESIOLOGY

## 2022-11-10 PROCEDURE — 71000015 HC POSTOP RECOV 1ST HR: Performed by: ORTHOPAEDIC SURGERY

## 2022-11-10 PROCEDURE — 25071 PR EXC TUMOR SOFT TISS FOREARM AND/WRIST SUBQ 3+CM: ICD-10-PCS | Mod: RT,,, | Performed by: ORTHOPAEDIC SURGERY

## 2022-11-10 PROCEDURE — 25000003 PHARM REV CODE 250: Performed by: NURSE ANESTHETIST, CERTIFIED REGISTERED

## 2022-11-10 PROCEDURE — D9220A PRA ANESTHESIA: ICD-10-PCS | Mod: CRNA,,, | Performed by: NURSE ANESTHETIST, CERTIFIED REGISTERED

## 2022-11-10 PROCEDURE — 36000706: Performed by: ORTHOPAEDIC SURGERY

## 2022-11-10 RX ORDER — ONDANSETRON 2 MG/ML
INJECTION INTRAMUSCULAR; INTRAVENOUS
Status: DISCONTINUED | OUTPATIENT
Start: 2022-11-10 | End: 2022-11-10

## 2022-11-10 RX ORDER — LIDOCAINE HYDROCHLORIDE 10 MG/ML
1 INJECTION, SOLUTION EPIDURAL; INFILTRATION; INTRACAUDAL; PERINEURAL ONCE
Status: CANCELLED | OUTPATIENT
Start: 2022-11-10 | End: 2022-11-10

## 2022-11-10 RX ORDER — CEFAZOLIN SODIUM 1 G/3ML
INJECTION, POWDER, FOR SOLUTION INTRAMUSCULAR; INTRAVENOUS
Status: DISCONTINUED | OUTPATIENT
Start: 2022-11-10 | End: 2022-11-10

## 2022-11-10 RX ORDER — OXYCODONE AND ACETAMINOPHEN 5; 325 MG/1; MG/1
1 TABLET ORAL
Status: DISCONTINUED | OUTPATIENT
Start: 2022-11-10 | End: 2022-11-10 | Stop reason: HOSPADM

## 2022-11-10 RX ORDER — SODIUM CHLORIDE, SODIUM LACTATE, POTASSIUM CHLORIDE, CALCIUM CHLORIDE 600; 310; 30; 20 MG/100ML; MG/100ML; MG/100ML; MG/100ML
INJECTION, SOLUTION INTRAVENOUS CONTINUOUS
Status: CANCELLED | OUTPATIENT
Start: 2022-11-10

## 2022-11-10 RX ORDER — PROPOFOL 10 MG/ML
VIAL (ML) INTRAVENOUS
Status: DISCONTINUED | OUTPATIENT
Start: 2022-11-10 | End: 2022-11-10

## 2022-11-10 RX ORDER — CEFAZOLIN SODIUM 2 G/50ML
SOLUTION INTRAVENOUS
Status: DISCONTINUED
Start: 2022-11-10 | End: 2022-11-10 | Stop reason: HOSPADM

## 2022-11-10 RX ORDER — SODIUM CHLORIDE, SODIUM LACTATE, POTASSIUM CHLORIDE, CALCIUM CHLORIDE 600; 310; 30; 20 MG/100ML; MG/100ML; MG/100ML; MG/100ML
INJECTION, SOLUTION INTRAVENOUS CONTINUOUS
Status: DISCONTINUED | OUTPATIENT
Start: 2022-11-10 | End: 2022-11-10 | Stop reason: HOSPADM

## 2022-11-10 RX ORDER — DEXAMETHASONE SODIUM PHOSPHATE 4 MG/ML
INJECTION, SOLUTION INTRA-ARTICULAR; INTRALESIONAL; INTRAMUSCULAR; INTRAVENOUS; SOFT TISSUE
Status: DISCONTINUED | OUTPATIENT
Start: 2022-11-10 | End: 2022-11-10

## 2022-11-10 RX ORDER — SODIUM CHLORIDE, SODIUM LACTATE, POTASSIUM CHLORIDE, CALCIUM CHLORIDE 600; 310; 30; 20 MG/100ML; MG/100ML; MG/100ML; MG/100ML
125 INJECTION, SOLUTION INTRAVENOUS CONTINUOUS
Status: DISCONTINUED | OUTPATIENT
Start: 2022-11-10 | End: 2022-11-10 | Stop reason: HOSPADM

## 2022-11-10 RX ORDER — ONDANSETRON 2 MG/ML
4 INJECTION INTRAMUSCULAR; INTRAVENOUS DAILY PRN
Status: DISCONTINUED | OUTPATIENT
Start: 2022-11-10 | End: 2022-11-10 | Stop reason: HOSPADM

## 2022-11-10 RX ORDER — LIDOCAINE HYDROCHLORIDE 20 MG/ML
INJECTION, SOLUTION EPIDURAL; INFILTRATION; INTRACAUDAL; PERINEURAL
Status: DISCONTINUED | OUTPATIENT
Start: 2022-11-10 | End: 2022-11-10

## 2022-11-10 RX ORDER — BUPIVACAINE HYDROCHLORIDE 5 MG/ML
INJECTION, SOLUTION EPIDURAL; INTRACAUDAL
Status: DISCONTINUED | OUTPATIENT
Start: 2022-11-10 | End: 2022-11-10 | Stop reason: HOSPADM

## 2022-11-10 RX ORDER — FENTANYL CITRATE 50 UG/ML
INJECTION, SOLUTION INTRAMUSCULAR; INTRAVENOUS
Status: DISCONTINUED | OUTPATIENT
Start: 2022-11-10 | End: 2022-11-10

## 2022-11-10 RX ORDER — MORPHINE SULFATE 4 MG/ML
2 INJECTION, SOLUTION INTRAMUSCULAR; INTRAVENOUS EVERY 5 MIN PRN
Status: DISCONTINUED | OUTPATIENT
Start: 2022-11-10 | End: 2022-11-10 | Stop reason: HOSPADM

## 2022-11-10 RX ORDER — MIDAZOLAM HYDROCHLORIDE 1 MG/ML
INJECTION INTRAMUSCULAR; INTRAVENOUS
Status: DISCONTINUED | OUTPATIENT
Start: 2022-11-10 | End: 2022-11-10

## 2022-11-10 RX ADMIN — DEXAMETHASONE SODIUM PHOSPHATE 4 MG: 4 INJECTION, SOLUTION INTRAMUSCULAR; INTRAVENOUS at 07:11

## 2022-11-10 RX ADMIN — PROPOFOL 200 MG: 10 INJECTION, EMULSION INTRAVENOUS at 07:11

## 2022-11-10 RX ADMIN — LIDOCAINE HYDROCHLORIDE 40 MG: 20 INJECTION, SOLUTION EPIDURAL; INFILTRATION; INTRACAUDAL; PERINEURAL at 07:11

## 2022-11-10 RX ADMIN — ONDANSETRON 4 MG: 2 INJECTION INTRAMUSCULAR; INTRAVENOUS at 07:11

## 2022-11-10 RX ADMIN — MIDAZOLAM HYDROCHLORIDE 5 MG: 1 INJECTION, SOLUTION INTRAMUSCULAR; INTRAVENOUS at 07:11

## 2022-11-10 RX ADMIN — OXYCODONE HYDROCHLORIDE AND ACETAMINOPHEN 1 TABLET: 5; 325 TABLET ORAL at 09:11

## 2022-11-10 RX ADMIN — MORPHINE SULFATE 2 MG: 4 INJECTION INTRAVENOUS at 08:11

## 2022-11-10 RX ADMIN — CEFAZOLIN 2 G: 330 INJECTION, POWDER, FOR SOLUTION INTRAMUSCULAR; INTRAVENOUS at 07:11

## 2022-11-10 RX ADMIN — SODIUM CHLORIDE, SODIUM LACTATE, POTASSIUM CHLORIDE, AND CALCIUM CHLORIDE: .6; .31; .03; .02 INJECTION, SOLUTION INTRAVENOUS at 06:11

## 2022-11-10 RX ADMIN — FENTANYL CITRATE 100 MCG: 50 INJECTION, SOLUTION INTRAMUSCULAR; INTRAVENOUS at 07:11

## 2022-11-10 NOTE — DISCHARGE SUMMARY
[facility] - Surgery  Discharge Note  Short Stay    Procedure(s) (LRB):  Excision Tumor Right Wrist (Right)      OUTCOME: Patient tolerated treatment/procedure well without complication and is now ready for discharge.    DISPOSITION: Home or Self Care    FINAL DIAGNOSIS:  Dorsal radial 3cm x 3.5cm x 2cm thick mass, right wrist.    FOLLOWUP: In clinic    DISCHARGE INSTRUCTIONS:    Discharge Procedure Orders   Diet Adult Regular     Keep surgical extremity elevated     Ice to affected area     Lifting restrictions   Order Comments: One-handed activities with the left hand only no lifting/pushing/pulling/climbing requiring the use of the right hand.  No activities on ladders/scaffolding/stairs.  May use right hand for activities of daily living such as dressing or eating     Other restrictions (specify):   Order Comments: 1. Elevate and ice right wrist.    2. Do not remove dressing, keep dressing clean and dry.    3. One-handed activities with the left hand only no lifting/pushing/pulling/climbing requiring the use of the right hand.  No activities on ladders/scaffolding/stairs.  May use right hand for activities of daily living such as dressing or eating     Notify your health care provider if you experience any of the following:  temperature >100.4     Leave dressing on - Keep it clean, dry, and intact until clinic visit   Order Comments: Do not remove dressing, keep dressing clean and dry.        TIME SPENT ON DISCHARGE: 20 minutes

## 2022-11-10 NOTE — OP NOTE
[facility]  Orthopedic Surgery    11/10/2022    Macy Mackay  [id number]      PREOPERATIVE DIAGNOSIS: Tenosynovial giant cell tumor of wrist [D48.1]    POSTOPERATIVE DIAGNOSIS:  Dorsal radial 3cm x 3.5cm x 2cm thick mass, right wrist.    PROCEDURE:  1. Excision 3cm x 3.5cm x 2cm thick mass, right wrist.  2. Short-arm thumb spica plaster splint, right wrist.      SURGEON: Ad Mascorro D.O.    ASSISTANT: Orton Grinnell, CFA    ANESTHESIA:  General.    BLOOD LOSS:  Less than 5 cc    TOURNIQUET:  20 minutes.    DRAINS:  None.    PATHOLOGY:  3 cm wide x 3.5 cm long x2 cm thick lobulated mass.    COMPLICATION:  None.    INDICATIONS FOR PROCEDURE:   Ms. Mackay is a 72-year-old right-hand-dominant lady who has a painful swelling at the basilar aspect of the right thumb which began approximately 2 years ago and increased in intensity over the last 2 months.  She had an aspiration which did not produce any fluid.  She stated, it just got there .  She denied trauma to her wrist.  Now writing, driving, and gripping items increases her symptoms while nothing seems to improve them.  She has concerned because is progressively increasing in size.  Now when she dorsiflexes her thumb the pain radiates to her elbow.  She has not taken NSAIDs, worn a brace, nor had injections.   She elected to proceed with surgery after complications to include bleeding, infection, scarring, nerve/blood vessel/tendon damage, need for further surgery, failed surgery, failure to improve, stiffness, skin slough, and possible recurrence were discussed.  She signed a consent.    PROCEDURE IN DETAIL:   The patient was brought to the operating room and transferred to the operating bed where all bony prominences were well padded.  General anesthesia was then administered by the Anesthesiology Department.  After general anesthesia was administered a tourniquet was applied to the upper part of the patient's right upper extremity.  The  patient's right arm was then prepped with chlorhexidine solution and draped in the normal sterile fashion.  After prepping and draping bony and soft tissue landmarks were identified and a longitudinal incision site was drawn over the mass at the patient's wrist.  The patient's arm was then elevated, exsanguinated, and the tourniquet was inflated.         Sharp incision was then made with a #15 blade followed by dissection in and around the mass with tenotomy scissors while observing appropriate tumor protocol.  The mass was excised in bulk.  After excision of the mass further excision of remnant mass was done with a rongeur.  The mass was placed on the back table and then sent to pathology for evaluation.  The incision was then copiously irrigated.       The tourniquet was then released and full hemostasis was ensured.  The incision was then closed in layers with Vicryl suture with final plastic closure.  The incision was then dressed with Mastisol, Steri-Strips, Adaptic, sterile gauze, sterile cast padding followed by a thumb spica short-arm plaster splint and an Ace wrap.  She was then awakened by anesthesia and transferred from the operating room to the recovery room in stable condition.  She tolerated the procedure well without complication.

## 2022-11-10 NOTE — ANESTHESIA POSTPROCEDURE EVALUATION
Anesthesia Post Evaluation    Patient: Macy Mackay    Procedure(s) Performed: Procedure(s) (LRB):  Excision Tumor Right Wrist (Right)    Final Anesthesia Type: general      Patient location during evaluation: PACU  Patient participation: Yes- Able to Participate  Level of consciousness: awake and awake and alert  Post-procedure vital signs: reviewed and stable  Pain management: adequate  Airway patency: patent    PONV status at discharge: No PONV  Anesthetic complications: no      Cardiovascular status: blood pressure returned to baseline  Respiratory status: unassisted and spontaneous ventilation  Hydration status: euvolemic  Follow-up not needed.          Vitals Value Taken Time   /65 11/10/22 0922   Temp 36.3 °C (97.4 °F) 11/10/22 0820   Pulse 62 11/10/22 0924   Resp 12 11/10/22 0925   SpO2 99 % 11/10/22 0924   Vitals shown include unvalidated device data.      Event Time   Out of Recovery 09:05:00         Pain/Rian Score: Pain Rating Prior to Med Admin: 5 (11/10/2022  9:09 AM)  Rian Score: 10 (11/10/2022  8:50 AM)  Modified Rian Score: 19 (11/10/2022  9:17 AM)

## 2022-11-10 NOTE — TRANSFER OF CARE
"Anesthesia Transfer of Care Note    Patient: Macy Mackay    Procedure(s) Performed: Procedure(s) (LRB):  Excision Tumor Right Wrist (Right)    Patient location: PACU    Anesthesia Type: general    Transport from OR: Transported from OR on room air with adequate spontaneous ventilation    Post pain: adequate analgesia    Post assessment: no apparent anesthetic complications    Post vital signs: stable    Level of consciousness: sedated and responds to stimulation    Nausea/Vomiting: no nausea/vomiting    Complications: none    Transfer of care protocol was followed      Last vitals:   Visit Vitals  BP (!) 144/67   Pulse 70   Temp 36.6 °C (97.9 °F) (Oral)   Resp 16   Ht 5' 5" (1.651 m)   Wt 53.5 kg (118 lb)   SpO2 100%   Breastfeeding No   BMI 19.64 kg/m²     "

## 2022-11-14 LAB — POCT GLUCOSE: 108 MG/DL (ref 70–110)

## 2022-11-15 VITALS
OXYGEN SATURATION: 100 % | TEMPERATURE: 97 F | HEIGHT: 65 IN | RESPIRATION RATE: 16 BRPM | BODY MASS INDEX: 19.66 KG/M2 | WEIGHT: 118 LBS | HEART RATE: 65 BPM | SYSTOLIC BLOOD PRESSURE: 149 MMHG | DIASTOLIC BLOOD PRESSURE: 65 MMHG

## 2022-11-18 LAB
FINAL PATHOLOGIC DIAGNOSIS: NORMAL
GROSS: NORMAL
Lab: NORMAL

## 2022-11-23 ENCOUNTER — OFFICE VISIT (OUTPATIENT)
Dept: ORTHOPEDICS | Facility: CLINIC | Age: 72
End: 2022-11-23
Payer: MEDICARE

## 2022-11-23 VITALS — RESPIRATION RATE: 18 BRPM | HEIGHT: 65 IN | WEIGHT: 117.94 LBS | BODY MASS INDEX: 19.65 KG/M2

## 2022-11-23 DIAGNOSIS — Z98.890 S/P WRIST SURGERY: Primary | ICD-10-CM

## 2022-11-23 PROCEDURE — 1125F AMNT PAIN NOTED PAIN PRSNT: CPT | Mod: CPTII,S$GLB,, | Performed by: ORTHOPAEDIC SURGERY

## 2022-11-23 PROCEDURE — 3288F FALL RISK ASSESSMENT DOCD: CPT | Mod: CPTII,S$GLB,, | Performed by: ORTHOPAEDIC SURGERY

## 2022-11-23 PROCEDURE — 99999 PR PBB SHADOW E&M-EST. PATIENT-LVL III: ICD-10-PCS | Mod: PBBFAC,,, | Performed by: ORTHOPAEDIC SURGERY

## 2022-11-23 PROCEDURE — 1125F PR PAIN SEVERITY QUANTIFIED, PAIN PRESENT: ICD-10-PCS | Mod: CPTII,S$GLB,, | Performed by: ORTHOPAEDIC SURGERY

## 2022-11-23 PROCEDURE — 3288F PR FALLS RISK ASSESSMENT DOCUMENTED: ICD-10-PCS | Mod: CPTII,S$GLB,, | Performed by: ORTHOPAEDIC SURGERY

## 2022-11-23 PROCEDURE — 99024 PR POST-OP FOLLOW-UP VISIT: ICD-10-PCS | Mod: S$GLB,,, | Performed by: ORTHOPAEDIC SURGERY

## 2022-11-23 PROCEDURE — 1101F PR PT FALLS ASSESS DOC 0-1 FALLS W/OUT INJ PAST YR: ICD-10-PCS | Mod: CPTII,S$GLB,, | Performed by: ORTHOPAEDIC SURGERY

## 2022-11-23 PROCEDURE — 99024 POSTOP FOLLOW-UP VISIT: CPT | Mod: S$GLB,,, | Performed by: ORTHOPAEDIC SURGERY

## 2022-11-23 PROCEDURE — 1101F PT FALLS ASSESS-DOCD LE1/YR: CPT | Mod: CPTII,S$GLB,, | Performed by: ORTHOPAEDIC SURGERY

## 2022-11-23 PROCEDURE — 99999 PR PBB SHADOW E&M-EST. PATIENT-LVL III: CPT | Mod: PBBFAC,,, | Performed by: ORTHOPAEDIC SURGERY

## 2022-11-23 PROCEDURE — 1159F PR MEDICATION LIST DOCUMENTED IN MEDICAL RECORD: ICD-10-PCS | Mod: CPTII,S$GLB,, | Performed by: ORTHOPAEDIC SURGERY

## 2022-11-23 PROCEDURE — 1159F MED LIST DOCD IN RCRD: CPT | Mod: CPTII,S$GLB,, | Performed by: ORTHOPAEDIC SURGERY

## 2022-11-23 PROCEDURE — 3008F PR BODY MASS INDEX (BMI) DOCUMENTED: ICD-10-PCS | Mod: CPTII,S$GLB,, | Performed by: ORTHOPAEDIC SURGERY

## 2022-11-23 PROCEDURE — 3008F BODY MASS INDEX DOCD: CPT | Mod: CPTII,S$GLB,, | Performed by: ORTHOPAEDIC SURGERY

## 2022-11-23 NOTE — PROGRESS NOTES
Subjective:      Patient ID: Macy Mackay is a 72 y.o. female.    Chief Complaint: Post-op Evaluation of the Right Wrist      HPI:  Ms. Mackay returned today for her 1st postop visit after an excision mass of the dorsal radial aspect of her right wrist.  Her date of surgery 11/10/2022.  She stated she is doing well and her pain is well controlled.    ROS:  New diagnosis/surgery/prescriptions since last office visit on 10/19/2022:  Excision dorsal radial lipoma, right wrist.  Constitutional: Negative for chills and fever.   HENT:  Negative for congestion.    Eyes:  Negative for blurred vision and double vision.   Cardiovascular:  Negative for chest pain and cyanosis.   Respiratory:  Negative for cough and shortness of breath.    Endocrine: Negative for polydipsia.   Hematologic/Lymphatic: Negative for adenopathy.   Skin:  Negative for flushing, itching and skin cancer.   Musculoskeletal:  Positive for back pain and neck pain. Negative for gout.   Gastrointestinal:  Negative for constipation, diarrhea and heartburn.   Genitourinary:  Negative for nocturia.   Neurological:  Positive for tremors. Negative for headaches and seizures.   Psychiatric/Behavioral:  Positive for depression. Negative for substance abuse. The patient is nervous/anxious.    Allergic/Immunologic: Negative for environmental allergies.       Objective:      Physical Exam:   General: AAOx3.  No acute distress  Vascular:  Pulses intact and equal bilaterally.  Capillary refill less than 3 seconds and equal bilaterally  Neurologic:  Pinprick and soft touch intact and equal bilaterally  Integment:  Incision well approximated with sutures in place.  Extremity:  Wrist: Pronation/supination near equal bilaterally.  Dorsiflexion/volar flexion slightly decreased right wrist compared to the left.  No swelling.  Nontender with motion.  Nontender with palpation.  Good finger motion without pain.  Radiography:  No new x-rays done today.    Pathology:   Lipoma with fat necrosis      Assessment:       Impression:     1. S/P excision dorsal radial mass, right wrist.         Plan:       1.  Discussed physical examination and pathology findings with the patient. Macy understands that she had an excision of a dorsal radial lipoma of her right wrist.  2. Remove sutures and place Steri-Strips.    3. Offered to refer to occupational therapy she declined for now.  4. In approximately 4-5 days she should start doing home exercises such as dishwashing to help regain some of her motion.    5. Any pain can be treated with over-the-counter medications dosed per box instructions.  6. Follow up p.r.n..

## 2023-01-18 ENCOUNTER — TELEPHONE (OUTPATIENT)
Dept: FAMILY MEDICINE | Facility: CLINIC | Age: 73
End: 2023-01-18
Payer: MEDICARE

## 2023-01-18 NOTE — TELEPHONE ENCOUNTER
Left voicemail for patient.  Informed Dr. Gottlieb full but could get seen tomorrow by Dr. Rachel Moore in [place].

## 2023-01-18 NOTE — TELEPHONE ENCOUNTER
----- Message from Angie Maria sent at 1/18/2023  3:45 PM CST -----  Contact: PT  Type:  Same Day Appointment Request    Caller is requesting a same day appointment.  Caller declined first available appointment listed below.      Name of Caller:  Macy/ PT  When is the first available appointment?  2/8/23    Symptoms:  Surgical Clearance  Best Call Back Number:  [phone]  Additional Information:  Pt asking to be seen tomorrow will go to [facility]

## 2023-01-19 ENCOUNTER — OFFICE VISIT (OUTPATIENT)
Dept: FAMILY MEDICINE | Facility: CLINIC | Age: 73
End: 2023-01-19
Payer: MEDICARE

## 2023-01-19 VITALS
HEIGHT: 65 IN | DIASTOLIC BLOOD PRESSURE: 70 MMHG | TEMPERATURE: 99 F | HEART RATE: 74 BPM | RESPIRATION RATE: 16 BRPM | BODY MASS INDEX: 20.01 KG/M2 | WEIGHT: 120.13 LBS | OXYGEN SATURATION: 97 % | SYSTOLIC BLOOD PRESSURE: 128 MMHG

## 2023-01-19 DIAGNOSIS — Z01.810 PREOP CARDIOVASCULAR EXAM: ICD-10-CM

## 2023-01-19 DIAGNOSIS — Z98.84 BARIATRIC SURGERY STATUS: ICD-10-CM

## 2023-01-19 DIAGNOSIS — E11.9 TYPE 2 DIABETES MELLITUS WITHOUT COMPLICATION, WITHOUT LONG-TERM CURRENT USE OF INSULIN: Primary | ICD-10-CM

## 2023-01-19 PROCEDURE — 3078F DIAST BP <80 MM HG: CPT | Mod: CPTII,S$GLB,, | Performed by: FAMILY MEDICINE

## 2023-01-19 PROCEDURE — 3074F SYST BP LT 130 MM HG: CPT | Mod: CPTII,S$GLB,, | Performed by: FAMILY MEDICINE

## 2023-01-19 PROCEDURE — 3078F PR MOST RECENT DIASTOLIC BLOOD PRESSURE < 80 MM HG: ICD-10-PCS | Mod: CPTII,S$GLB,, | Performed by: FAMILY MEDICINE

## 2023-01-19 PROCEDURE — 99999 PR PBB SHADOW E&M-EST. PATIENT-LVL III: CPT | Mod: PBBFAC,,, | Performed by: FAMILY MEDICINE

## 2023-01-19 PROCEDURE — 99214 PR OFFICE/OUTPT VISIT, EST, LEVL IV, 30-39 MIN: ICD-10-PCS | Mod: S$GLB,,, | Performed by: FAMILY MEDICINE

## 2023-01-19 PROCEDURE — 3008F PR BODY MASS INDEX (BMI) DOCUMENTED: ICD-10-PCS | Mod: CPTII,S$GLB,, | Performed by: FAMILY MEDICINE

## 2023-01-19 PROCEDURE — 99214 OFFICE O/P EST MOD 30 MIN: CPT | Mod: S$GLB,,, | Performed by: FAMILY MEDICINE

## 2023-01-19 PROCEDURE — 3008F BODY MASS INDEX DOCD: CPT | Mod: CPTII,S$GLB,, | Performed by: FAMILY MEDICINE

## 2023-01-19 PROCEDURE — 99999 PR PBB SHADOW E&M-EST. PATIENT-LVL III: ICD-10-PCS | Mod: PBBFAC,,, | Performed by: FAMILY MEDICINE

## 2023-01-19 PROCEDURE — 3074F PR MOST RECENT SYSTOLIC BLOOD PRESSURE < 130 MM HG: ICD-10-PCS | Mod: CPTII,S$GLB,, | Performed by: FAMILY MEDICINE

## 2023-01-19 NOTE — PROGRESS NOTES
"  Family Medicine Note  [facility]    Chief Complaint   Patient presents with    surgery     Surgery clearance        HPI:  72 female here for surgical clearance - had bariatric surgery in past - 1st sleeve, then gastric bypass with reconstruction.  Now having notable dysphagia, and needing clearance for laparoscopy    Dr. Memo Ruff    Had pre-op labs and CT scan done prior, needs EKG today    Diabetes controlled with A1C at goal    Can easily ambulate at 4 mets physical activity without any undue stress      ROS: +dysphagia    Vitals:    01/19/23 1414   BP: 128/70   BP Location: Left arm   Patient Position: Sitting   BP Method: Medium (Manual)   Pulse: 74   Resp: 16   Temp: 98.6 °F (37 °C)   TempSrc: Oral   SpO2: 97%   Weight: 54.5 kg (120 lb 2.4 oz)   Height: 5' 5" (1.651 m)      Body mass index is 19.99 kg/m².      General:  AOx3, well nourished and developed in no acute distress  Eyes:  PERRLA, EOMI, vision intact grossly  ENT:  normal hearing, moist oral mucosa  Neck:  trachea midline with no masses or thyromegaly  Heart:  RRR, no murmurs.  No edema noted, extremities warm and well perfused  Lungs:  clear to auscultation bilaterally with symmetric chest movement  Abdomen:  Soft, nontender, nondistended.  Normal bowel sounds  Musculoskeletal:  Normal gait.  Normal posture.  Normal muscular development with no joint swelling.  Neurological:  CN II-XII grossly intact. Symmetric strength and sensation  Psych:  Normal mood and affect.  Able to demonstrate good judgement and personal insight.      Assessment/Plan:    Type 2 diabetes mellitus without complication, without long-term current use of insulin    Preop cardiovascular exam    Bariatric surgery status     Chronic conditions quite stable.  Reviewed preop labs.  EKG shows no injury pattern in office.  Will clear for surgery.        "

## 2023-01-19 NOTE — LETTER
January 19, 2023        Juan Hampton MD  [address] [facility] [address]  [address]  [facility] [id number] [place] [address]  [address] [facility] [address]  Phone: [phone]   Patient: Macy Mackay   MR Number: [id number]   YOB: 1950   Date of Visit: 1/19/2023       Dear Dr. Hampton:      I had the privilege of seeing Macy in the office today.  Her EKG in the office showed no injury pattern.  She is cleared from my standpoint for her laparoscopic procedure    Sincerely,      Jose Miguel Gottlieb MD            CC  No Recipients    Enclosure

## 2023-01-20 DIAGNOSIS — E11.9 TYPE 2 DIABETES MELLITUS WITHOUT COMPLICATION: ICD-10-CM

## 2023-01-25 ENCOUNTER — PATIENT OUTREACH (OUTPATIENT)
Dept: ADMINISTRATIVE | Facility: HOSPITAL | Age: 73
End: 2023-01-25
Payer: MEDICARE

## 2023-01-30 NOTE — TELEPHONE ENCOUNTER
----- Message from Etta Fox sent at 1/30/2023  3:54 PM CST -----  Type:  RX Refill Request    Who Called:  PT  Refill or New Rx:  REFILL  RX Name and Strength:    1.  duloxetine (CYMBALTA) 60 MG capsule     --  Sig - Route: Take 60 mg by mouth 2 (two) times daily. - Oral    2. quiNINE sulfate 324 mg Cap   --  Sig: TAKE 1 CAPSULE BY MOUTH EVERYDAY AT BEDTIME      is the patient currently taking it? (ex. 1XDay):  AS ORDER  Is this a 30 day or 90 day RX:  AS ORDER  Preferred Pharmacy with phone number:        [facility] [place] - [address]  [address]  [place] [address]  Phone: [phone] Fax: [phone]      Local or Mail Order:  LOCAL  Ordering Provider:  QUENTIN Almonte Call Back Number:  [phone]    Additional Information:

## 2023-01-31 RX ORDER — DULOXETIN HYDROCHLORIDE 60 MG/1
60 CAPSULE, DELAYED RELEASE ORAL 2 TIMES DAILY
Qty: 60 CAPSULE | Refills: 6 | Status: SHIPPED | OUTPATIENT
Start: 2023-01-31 | End: 2023-03-14 | Stop reason: SDUPTHER

## 2023-01-31 RX ORDER — QUININE SULFATE 324 MG/1
1 CAPSULE ORAL NIGHTLY
Qty: 30 CAPSULE | Refills: 6 | Status: SHIPPED | OUTPATIENT
Start: 2023-01-31

## 2023-02-20 ENCOUNTER — PATIENT OUTREACH (OUTPATIENT)
Dept: ADMINISTRATIVE | Facility: HOSPITAL | Age: 73
End: 2023-02-20
Payer: MEDICARE

## 2023-02-20 NOTE — PROGRESS NOTES
Population Health Outreach.    Requested Colonoscopy from [facility]    Spoke with patient and she stated she had labs at [facility]. Records requested

## 2023-02-20 NOTE — LETTER
"                      FAX      AUTHORIZATION FOR RELEASE OF   CONFIDENTIAL INFORMATION        [facility]        We are seeing Macy Mackay, date of birth 1950, in the clinic at [facility]. Jose Miguel Gottlieb MD is the patient's PCP. Macy Mackay has an outstanding lab/procedure at the time we reviewed their chart. In order to help keep their health information updated, Macy Mackay has authorized us to request the following medical record(s):             ( X )  COLONOSCOPY (WITHIN 10 YRS)            Please fax records to [facility]  [phone]        DARRYL ISAAC LPN  CLINICAL CARE COORDINATOR   [facility]  PHONE: [phone]  FAX: [phone]                         A. Consent for Examination and Treatment: I hereby authorize the providers and employees of [facility] to provide medical treatment/services which includes, but is not limited to, performing and administering tests and diagnostic procedures that are deemed necessary, Including, but not limited to, imaging examinations, blood tests and other laboratory procedures as may be required by the hospital, clinic, or may be ordered by my physician(s) or persons working under the general and/or special instructions of my physician(s).                   1.      I understand and agree that this consent covers all authorized persons, including but not limited to physicians, residents, nurse practitioners, physicians' assistants, specialists, consultants, student nurses, and independently contracted physicians, who are called upon by the physician in charge, to carry out the diagnostic procedures and medical or surgical treatment.  2.      I hereby authorize [facility] to retain or dispose of any specimens or tissue, should there be such remaining from any test or procedure.  3.      I hereby authorize and give consent for [facility] providers and employees to take " photographs, images or videotapes of such diagnostic, surgical or treatment procedures of Patient as may be required by [facility] or as may be ordered by a physician.  I further acknowledge and agree that [facility] may use cameras or other devices for patient monitoring.  4.      I am aware that the practice of medicine is not an exact science, and I acknowledge that no guarantees have been made to me as to the outcome of any tests, procedures or treatment.                   B. Authorization for Release of Information:  I understand that my insurance company and/or their agents may need information necessary to make determinations about payment/reimbursement.  I hereby provide authorization to release to all insurance companies, their successors, assignees, other parties with whom they may have contracted, or others acting on their behalf, that are involved with payment for any hospital and/or clinic charges incurred by the patient, any information that they request and deem necessary for payment/reimbursement, and/or quality review.  I further authorize the release of my health information to physicians or other health care practitioners on staff who are involved in my health care now and in the future, and to other health care providers, entities, or institutions for the purpose of my continued care and treatment, including referrals.     C. Medicare Patient's Certification and Authorization to Release Information and Payment Request:  I certify that the information given by me in applying for payment under Title XVIII of the Social Security Act is correct.  I authorize any bryant of medical or other information about me to release to the Social Security Administration, or its intermediaries or carriers, any information needed for this or a related Medicare claim.  I request that payment of authorized benefits be made on my behalf.     D. Assignment of Insurance Benefits:  I hereby authorize any and all insurance  companies, health plans, defined benefit plans, health insurers or any entity that is or may be responsible for payment of my medical expenses to pay all hospital and medical benefits now due, and to become due and payable to me under any hospital benefits, sick benefits, injury benefits or any other benefit for services rendered to me, including Major Medical Benefits, direct to [facility] and all independently contracted physicians.  I assign any and all rights that I may have against any and all insurance companies, health plans, defined benefit plans, health insurers or any entity that is or may be responsible for payment of my medical expenses, including, but not limited to any right to appeal a denial of a claim, any right to bring any action, lawsuit, administrative proceeding, or other cause of action on my behalf.  I specifically assign my right to pursue litigation against any and all insurance companies, health plans, defined benefit plans, health insurers or any entity that is or may be responsible for payment of medical expenses based upon a refusal to pay charges.              REGISTRATION  AUTHORIZATION                    E. Valuables:  It is understood and agreed that [facility] is not liable for the damage to or loss of any money, jewelry, documents, dentures, eye glasses, hearing aids, prosthetics, or other property of value.     F. Computer Equipment:  I understand and agree that should I choose to use computer equipment owned by [facility] or if I choose to access the Internet via [facility]'s network, I do so at my own risk.  [facility] is not responsible for any damage to my computer equipment or to any damages of any type that might arise from my loss of equipment or data.                   G. Acceptance of Financial Responsibility:  I agree that in consideration of the services and supplies that have been or will be furnished to the patient,  I am hereby obligated to pay all charges made for or on the  account of the patient according to the standard rates (in effect at the time the services and supplies are delivered) established by [facility], including its Patient Financial Assistance Policy to the extent it is applicable.  I understand that I am responsible for all charges, or portions thereof, not covered by insurance or other sources.  Patient refunds will be distributed only after balances at all [facility] facilities are paid.                   H. Communication Authorization:  I hereby authorize [facility] and its representatives, along with any billing service or  who may work on their behalf, to contact me on my cell phone and/or home phone using prerecorded messages, artificial voice messages, automatic telephone dialing devices or other computer assisted technology, or by electronic mail, text messaging, or by any other form of electronic communication.  This includes, but is not limited to appointment reminders, yearly physical exam reminders, preventive care reminders, patient campaigns, welcome calls, and calls about account balances on my account or any account on which I am listed as a guarantor.  I understand I have the right to opt out of these communications at any time.     I.  Relationship Between Facility and Physician:  I understand that some, but not all, providers furnishing services to the patient are not employees or agents of [facility].  The patient is under the care and supervision of his/her attending physician, and it is the responsibility of the facility and its nursing staff to carry out the instructions of such physicians.  It is the responsibility of the patient's physician/designee to obtain the patient's informed consent, when required, for medical or surgical treatment, special diagnostic or therapeutic procedures, or hospital services rendered for the patient under the special instructions of the physician/designee.     J. Notice of Private Practices:  I acknowledge I  have received a copy of [facility]'s Notice of Privacy Practices.     K. Facility Directory:  I have discussed with the organization my desire to be either included or excluded in the facility directory.  I understand that if my choice is to opt-out of being identified in the facility directory that the facility will not provide any information about me such as my condition (e.g. Fair, stable, etc.) or my location in the facility (e.g. Room number, department).     L. LINKS:  For [place] Residents:  [facility] is a LINKS ([place] [facility]) participating facility.  LINKS is a [facility]-sponsored confidential computer system that helps you and your doctor keep track of you and your child's immunization history.  I acknowledge that I am allowing [facility] to share this information with [facility].  For [place] Residents:  [facility] is a MIIX ([place] Immunization Information eXchange) participant.  [employer] is a [place] Department of Health-sponsored confidential computer system that helps you and your doctor keep track of you and your child's immunization history.  I acknowledge that I am allowing [facility] to share information with [facility].     M. Term:  This authorization is valid for this and subsequent care/treatment I receive at [facility] and will remain valid unless/until revoked in writing by me.      ACKNOWLEDGMENT OF POTENTIAL RISKS OF COVID-19 VACCINE  It is important that you, as the patient or patients legally authorized representative(s), understand and acknowledge the following, with regard to administration of the COVID-19 vaccine offered by [facility]:  The SARS-CoV-2 virus (COVID-19) has caused an unprecedented modern global pandemic that has mobilized scientists and drug manufacturers to work to create safe and effective vaccines to get the crisis under control.  No vaccine is released in the [place] without undergoing rigorous, multi-layered testing and approval by the  Food and Drug Administration.  During a public health emergency, however, vaccines can be released for patient administration by the FDA prior to completion of multi-phase clinical trials and approval. This is done by the FDAs granting of Emergency Use Authorization (EUA) when the vaccine meets reasonable thresholds for safety and effectiveness and people are in urgent need of care. Under an EUA, the FDA has found that known and potential benefits outweigh its known and potential risks.  The vaccine for which you are presenting to [facility] has been released under an EUA, which [facility] is honoring in its distribution of the vaccine to the public. While the FDAs authorization indicates its belief that usage is recommended over possible risks, there is still the possibility that unknown risks of the vaccine could exist.  By signing this document, you acknowledge and assume these risks. Further, you waive any and all claims of liability against and hold harmless any [facility] entity or provider for any harm caused to you by said possible unknown risks of the vaccine.        N. [facility]:  As used in this document, [facility] means all [facility] affiliated entities including all health centers, surgery centers, clinics, and hospitals.  It includes more specifically, the following entities: [facility], a not for profit [facility], and its subsidiaries and affiliates, including [facility], [facility], [facility], [facility]., [facility], [facility]., [facility].d/b/a [facility], [facility].d/b/a [facility] [employer] As manager of [facility], [facility], L.L.C.d/b/a [facility], [facility] and [facility] as manager of [facility].                                                                Patient/Legal Guardian Signature                                                    This signature was collected at 11/10/2022      Macy Mackay/Self                                                                            Printed Name/Relationship to Patient                                                [facility] complies with applicable Federal civil rights laws and does not discriminate on the basis of race, color, national origin, age, disability, or sex.          ATENCIÓN: si habla español, tiene a mc disposición servicios gratuitos de asistencia lingüística. Sarah silva [phone].         CHÚ Ý: N?u b?n nói Ti?ng Vi?t, có các d?ch v? h? tr? ngôn ng? mi?n phí dành cho b?n. G?i s? [phone].              REGISTRATION  AUTHORIZATION

## 2023-02-20 NOTE — LETTER
"                        FAX      AUTHORIZATION FOR RELEASE OF   CONFIDENTIAL INFORMATION    [facility]      We are seeing Macy Mackay, date of birth 1950, in the clinic at [facility]. Jose Miguel Gottlieb MD is the patient's PCP. Macy Mackay has an outstanding lab/procedure at the time we reviewed their chart. In order to help keep their health information updated, Macy Mackay has authorized us to request the following medical record(s):         ( X )  MOST RECENT LAB RESULTS                                ( X)  SURGERY PROCEDURE/NOTES        Please fax records to [facility]  [phone]        DARRYL ISAAC LPN  CLINICAL CARE COORDINATOR   [facility]  PHONE: [phone]  FAX: [phone]                       A. Consent for Examination and Treatment: I hereby authorize the providers and employees of [facility] ("[facility] to provide medical treatment/services which includes, but is not limited to, performing and administering tests and diagnostic procedures that are deemed necessary, Including, but not limited to, imaging examinations, blood tests and other laboratory procedures as may be required by the hospital, clinic, or may be ordered by my physician(s) or persons working under the general and/or special instructions of my physician(s).                   1.      I understand and agree that this consent covers all authorized persons, including but not limited to physicians, residents, nurse practitioners, physicians' assistants, specialists, consultants, student nurses, and independently contracted physicians, who are called upon by the physician in charge, to carry out the diagnostic procedures and medical or surgical treatment.  2.      I hereby authorize [facility] to retain or dispose of any specimens or tissue, should there be such remaining from any test or procedure.  3.      I hereby authorize and give " consent for [facility] providers and employees to take photographs, images or videotapes of such diagnostic, surgical or treatment procedures of Patient as may be required by [facility] or as may be ordered by a physician.  I further acknowledge and agree that [facility] may use cameras or other devices for patient monitoring.  4.      I am aware that the practice of medicine is not an exact science, and I acknowledge that no guarantees have been made to me as to the outcome of any tests, procedures or treatment.                   B. Authorization for Release of Information:  I understand that my insurance company and/or their agents may need information necessary to make determinations about payment/reimbursement.  I hereby provide authorization to release to all insurance companies, their successors, assignees, other parties with whom they may have contracted, or others acting on their behalf, that are involved with payment for any hospital and/or clinic charges incurred by the patient, any information that they request and deem necessary for payment/reimbursement, and/or quality review.  I further authorize the release of my health information to physicians or other health care practitioners on staff who are involved in my health care now and in the future, and to other health care providers, entities, or institutions for the purpose of my continued care and treatment, including referrals.     C. Medicare Patient's Certification and Authorization to Release Information and Payment Request:  I certify that the information given by me in applying for payment under Title XVIII of the Social Security Act is correct.  I authorize any bryant of medical or other information about me to release to the Social Security Administration, or its intermediaries or carriers, any information needed for this or a related Medicare claim.  I request that payment of authorized benefits be made on my behalf.     D. Assignment of Insurance  Benefits:  I hereby authorize any and all insurance companies, health plans, defined benefit plans, health insurers or any entity that is or may be responsible for payment of my medical expenses to pay all hospital and medical benefits now due, and to become due and payable to me under any hospital benefits, sick benefits, injury benefits or any other benefit for services rendered to me, including Major Medical Benefits, direct to [facility] and all independently contracted physicians.  I assign any and all rights that I may have against any and all insurance companies, health plans, defined benefit plans, health insurers or any entity that is or may be responsible for payment of my medical expenses, including, but not limited to any right to appeal a denial of a claim, any right to bring any action, lawsuit, administrative proceeding, or other cause of action on my behalf.  I specifically assign my right to pursue litigation against any and all insurance companies, health plans, defined benefit plans, health insurers or any entity that is or may be responsible for payment of medical expenses based upon a refusal to pay charges.              REGISTRATION  AUTHORIZATION                    E. Valuables:  It is understood and agreed that [facility] is not liable for the damage to or loss of any money, jewelry, documents, dentures, eye glasses, hearing aids, prosthetics, or other property of value.     F. Computer Equipment:  I understand and agree that should I choose to use computer equipment owned by [facility] or if I choose to access the Internet via [facility]'s network, I do so at my own risk.  [facility] is not responsible for any damage to my computer equipment or to any damages of any type that might arise from my loss of equipment or data.                   G. Acceptance of Financial Responsibility:  I agree that in consideration of the services and supplies that have been or will be furnished to the patient,  I am hereby  obligated to pay all charges made for or on the account of the patient according to the standard rates (in effect at the time the services and supplies are delivered) established by [facility], including its Patient Financial Assistance Policy to the extent it is applicable.  I understand that I am responsible for all charges, or portions thereof, not covered by insurance or other sources.  Patient refunds will be distributed only after balances at all [facility] facilities are paid.                   H. Communication Authorization:  I hereby authorize [facility] and its representatives, along with any billing service or  who may work on their behalf, to contact me on my cell phone and/or home phone using prerecorded messages, artificial voice messages, automatic telephone dialing devices or other computer assisted technology, or by electronic mail, text messaging, or by any other form of electronic communication.  This includes, but is not limited to appointment reminders, yearly physical exam reminders, preventive care reminders, patient campaigns, welcome calls, and calls about account balances on my account or any account on which I am listed as a guarantor.  I understand I have the right to opt out of these communications at any time.     I.  Relationship Between Facility and Physician:  I understand that some, but not all, providers furnishing services to the patient are not employees or agents of [facility].  The patient is under the care and supervision of his/her attending physician, and it is the responsibility of the facility and its nursing staff to carry out the instructions of such physicians.  It is the responsibility of the patient's physician/designee to obtain the patient's informed consent, when required, for medical or surgical treatment, special diagnostic or therapeutic procedures, or hospital services rendered for the patient under the special instructions of the physician/designeeShauna RODRIGUEZ  Notice of Private Practices:  I acknowledge I have received a copy of [facility]'s Notice of Privacy Practices.     K. Facility Directory:  I have discussed with the organization my desire to be either included or excluded in the facility directory.  I understand that if my choice is to opt-out of being identified in the facility directory that the facility will not provide any information about me such as my condition (e.g. Fair, stable, etc.) or my location in the facility (e.g. Room number, department).     L. LINKS:  For [place] Residents:  [facility] is a LINKS ([place] [facility]) participating facility.  LINKS is a [facility]-sponsored confidential computer system that helps you and your doctor keep track of you and your child's immunization history.  I acknowledge that I am allowing [facility] to share this information with [facility].  For [place] Residents:  [facility] is a MIIX ([place] Immunization Information eXchange) participant.  MIIX is a [place] Department of Health-sponsored confidential computer system that helps you and your doctor keep track of you and your child's immunization history.  I acknowledge that I am allowing [facility] to share information with [facility].     M. Term:  This authorization is valid for this and subsequent care/treatment I receive at [facility] and will remain valid unless/until revoked in writing by me.      ACKNOWLEDGMENT OF POTENTIAL RISKS OF COVID-19 VACCINE  It is important that you, as the patient or patients legally authorized representative(s), understand and acknowledge the following, with regard to administration of the COVID-19 vaccine offered by [facility]:  The SARS-CoV-2 virus (COVID-19) has caused an unprecedented modern global pandemic that has mobilized scientists and drug manufacturers to work to create safe and effective vaccines to get the crisis under control.  No vaccine is released in the [place] without undergoing  rigorous, multi-layered testing and approval by the Food and Drug Administration.  During a public health emergency, however, vaccines can be released for patient administration by the FDA prior to completion of multi-phase clinical trials and approval. This is done by the FDAs granting of Emergency Use Authorization (EUA) when the vaccine meets reasonable thresholds for safety and effectiveness and people are in urgent need of care. Under an EUA, the FDA has found that known and potential benefits outweigh its known and potential risks.  The vaccine for which you are presenting to [facility] has been released under an EUA, which [facility] is honoring in its distribution of the vaccine to the public. While the FDAs authorization indicates its belief that usage is recommended over possible risks, there is still the possibility that unknown risks of the vaccine could exist.  By signing this document, you acknowledge and assume these risks. Further, you waive any and all claims of liability against and hold harmless any [facility] entity or provider for any harm caused to you by said possible unknown risks of the vaccine.        N. [facility]:  As used in this document, [facility] means all [facility] affiliated entities including all health centers, surgery centers, clinics, and hospitals.  It includes more specifically, the following entities: [facility], a not for profit [facility], and its subsidiaries and affiliates, including [facility], [facility], [facility], [facility], [facility], [facility]d/b/a [facility], L.L[facility]C.d/b/a [facility] [employer] As manager of Sandoval RODRIGUEZ [facility],  [facility], [facility], [facility] [employer], L.L.C.d/b/a [facility], [facility] and [facility] as manager of [facility].                                                                Patient/Legal Guardian Signature                                                    This signature was collected at 11/10/2022      Macy Mackay/Self                                                                            Printed Name/Relationship to Patient                                                [facility] complies with applicable Federal civil rights laws and does not discriminate on the basis of race, color, national origin, age, disability, or sex.          ATENCIÓN: si chandula milan, tiene a mc disposición servicios gratuitos de asistencia lingüística. Sarah silva [phone].         CHÚ Ý: N?u b?n nói Ti?ng Vi?t, có các d?ch v? h? tr? ngôn ng? mi?n phí dành cho b?n. G?i s? [phone].              REGISTRATION  AUTHORIZATION

## 2023-03-06 ENCOUNTER — PATIENT OUTREACH (OUTPATIENT)
Dept: ADMINISTRATIVE | Facility: HOSPITAL | Age: 73
End: 2023-03-06
Payer: MEDICARE

## 2023-03-06 NOTE — PROGRESS NOTES
Population Health Outreach.    Requested colonoscopy from [facility]. Received fax from [facility] that patient did not have colonoscopy performed at that facility

## 2023-03-14 RX ORDER — DULOXETIN HYDROCHLORIDE 60 MG/1
60 CAPSULE, DELAYED RELEASE ORAL 2 TIMES DAILY
Qty: 60 CAPSULE | Refills: 6 | Status: SHIPPED | OUTPATIENT
Start: 2023-03-14 | End: 2023-09-14

## 2023-03-14 NOTE — TELEPHONE ENCOUNTER
----- Message from Dalila Eisenberg sent at 3/14/2023  2:15 PM CDT -----  Type:  RX Refill Request    Who Called:  Pt   Refill or New Rx:  refill  RX Name and Strength:  DULoxetine (CYMBALTA) 60 MG capsule   How is the patient currently taking it? (ex. 1XDay):  2xday   Is this a 30 day or 90 day RX:  90  Preferred Pharmacy with phone number:    [facility] - [place] - [address]  [facility] [address]  [place] [address]  Phone: [phone] Fax: [phone]      Local or Mail Order:  Local   Ordering Provider:  Chery Almonte Call Back Number:  [phone]

## 2023-03-30 ENCOUNTER — PATIENT OUTREACH (OUTPATIENT)
Dept: ADMINISTRATIVE | Facility: HOSPITAL | Age: 73
End: 2023-03-30
Payer: MEDICARE

## 2023-03-30 NOTE — LETTER
"                      FAX      AUTHORIZATION FOR RELEASE OF   CONFIDENTIAL INFORMATION        MEDICAL RECORDS      We are seeing Macy Mackay, date of birth 1950, in the clinic at [facility]. Jose Miguel Gottlieb MD is the patient's PCP. Macy Mackay has an outstanding lab/procedure at the time we reviewed their chart. In order to help keep their health information updated, Macy Mackay has authorized us to request the following medical record(s):         ( X )  COLONOSCOPY (WITHIN 10 YRS)            Please fax records to [facility]  [phone]            DARRYL ISAAC LPN  CLINICAL CARE COORDINATOR   [facility]  PHONE: [phone]  FAX: [phone]                               A. Consent for Examination and Treatment: I hereby authorize the providers and employees of [facility] to provide medical treatment/services which includes, but is not limited to, performing and administering tests and diagnostic procedures that are deemed necessary, Including, but not limited to, imaging examinations, blood tests and other laboratory procedures as may be required by the hospital, clinic, or may be ordered by my physician(s) or persons working under the general and/or special instructions of my physician(s).                   1.      I understand and agree that this consent covers all authorized persons, including but not limited to physicians, residents, nurse practitioners, physicians' assistants, specialists, consultants, student nurses, and independently contracted physicians, who are called upon by the physician in charge, to carry out the diagnostic procedures and medical or surgical treatment.  2.      I hereby authorize [facility] to retain or dispose of any specimens or tissue, should there be such remaining from any test or procedure.  3.      I hereby authorize and give consent for [facility] providers and employees to take photographs, " images or videotapes of such diagnostic, surgical or treatment procedures of Patient as may be required by [facility] or as may be ordered by a physician.  I further acknowledge and agree that [facility] may use cameras or other devices for patient monitoring.  4.      I am aware that the practice of medicine is not an exact science, and I acknowledge that no guarantees have been made to me as to the outcome of any tests, procedures or treatment.                   B. Authorization for Release of Information:  I understand that my insurance company and/or their agents may need information necessary to make determinations about payment/reimbursement.  I hereby provide authorization to release to all insurance companies, their successors, assignees, other parties with whom they may have contracted, or others acting on their behalf, that are involved with payment for any hospital and/or clinic charges incurred by the patient, any information that they request and deem necessary for payment/reimbursement, and/or quality review.  I further authorize the release of my health information to physicians or other health care practitioners on staff who are involved in my health care now and in the future, and to other health care providers, entities, or institutions for the purpose of my continued care and treatment, including referrals.     C. Medicare Patient's Certification and Authorization to Release Information and Payment Request:  I certify that the information given by me in applying for payment under Title XVIII of the Social Security Act is correct.  I authorize any bryant of medical or other information about me to release to the Social Security Administration, or its intermediaries or carriers, any information needed for this or a related Medicare claim.  I request that payment of authorized benefits be made on my behalf.     D. Assignment of Insurance Benefits:  I hereby authorize any and all insurance companies, health  plans, defined benefit plans, health insurers or any entity that is or may be responsible for payment of my medical expenses to pay all hospital and medical benefits now due, and to become due and payable to me under any hospital benefits, sick benefits, injury benefits or any other benefit for services rendered to me, including Major Medical Benefits, direct to [facility] and all independently contracted physicians.  I assign any and all rights that I may have against any and all insurance companies, health plans, defined benefit plans, health insurers or any entity that is or may be responsible for payment of my medical expenses, including, but not limited to any right to appeal a denial of a claim, any right to bring any action, lawsuit, administrative proceeding, or other cause of action on my behalf.  I specifically assign my right to pursue litigation against any and all insurance companies, health plans, defined benefit plans, health insurers or any entity that is or may be responsible for payment of medical expenses based upon a refusal to pay charges.              REGISTRATION  AUTHORIZATION                    E. Valuables:  It is understood and agreed that [facility] is not liable for the damage to or loss of any money, jewelry, documents, dentures, eye glasses, hearing aids, prosthetics, or other property of value.     F. Computer Equipment:  I understand and agree that should I choose to use computer equipment owned by [facility] or if I choose to access the Internet via [facility]'s network, I do so at my own risk.  [facility] is not responsible for any damage to my computer equipment or to any damages of any type that might arise from my loss of equipment or data.                   G. Acceptance of Financial Responsibility:  I agree that in consideration of the services and supplies that have been or will be furnished to the patient,  I am hereby obligated to pay all charges made for or on the account of the patient  according to the standard rates (in effect at the time the services and supplies are delivered) established by [facility], including its Patient Financial Assistance Policy to the extent it is applicable.  I understand that I am responsible for all charges, or portions thereof, not covered by insurance or other sources.  Patient refunds will be distributed only after balances at all [facility] facilities are paid.                   H. Communication Authorization:  I hereby authorize [facility] and its representatives, along with any billing service or  who may work on their behalf, to contact me on my cell phone and/or home phone using prerecorded messages, artificial voice messages, automatic telephone dialing devices or other computer assisted technology, or by electronic mail, text messaging, or by any other form of electronic communication.  This includes, but is not limited to appointment reminders, yearly physical exam reminders, preventive care reminders, patient campaigns, welcome calls, and calls about account balances on my account or any account on which I am listed as a guarantor.  I understand I have the right to opt out of these communications at any time.     I.  Relationship Between Facility and Physician:  I understand that some, but not all, providers furnishing services to the patient are not employees or agents of [facility].  The patient is under the care and supervision of his/her attending physician, and it is the responsibility of the facility and its nursing staff to carry out the instructions of such physicians.  It is the responsibility of the patient's physician/designee to obtain the patient's informed consent, when required, for medical or surgical treatment, special diagnostic or therapeutic procedures, or hospital services rendered for the patient under the special instructions of the physician/designee.     J. Notice of Private Practices:  I acknowledge I have received a copy of  [facility]'s Notice of Privacy Practices.     K. Facility Directory:  I have discussed with the organization my desire to be either included or excluded in the facility directory.  I understand that if my choice is to opt-out of being identified in the facility directory that the facility will not provide any information about me such as my condition (e.g. Fair, stable, etc.) or my location in the facility (e.g. Room number, department).     L. LINKS:  For [place] Residents:  [facility] is a LINKS ([place] [facility]) participating facility.  [facility] is a [facility]-sponsored confidential computer system that helps you and your doctor keep track of you and your child's immunization history.  I acknowledge that I am allowing [facility] to share this information with [facility].  For [place] Residents:  [facility] is a MIIX ([place] Immunization Information eXchange) participant.  [facility] is a [place] Department of Health-sponsored confidential computer system that helps you and your doctor keep track of you and your child's immunization history.  I acknowledge that I am allowing [facility] to share information with [employer].     M. Term:  This authorization is valid for this and subsequent care/treatment I receive at [facility] and will remain valid unless/until revoked in writing by me.      ACKNOWLEDGMENT OF POTENTIAL RISKS OF COVID-19 VACCINE  It is important that you, as the patient or patients legally authorized representative(s), understand and acknowledge the following, with regard to administration of the COVID-19 vaccine offered by [facility]:  The SARS-CoV-2 virus (COVID-19) has caused an unprecedented modern global pandemic that has mobilized scientists and drug manufacturers to work to create safe and effective vaccines to get the crisis under control.  No vaccine is released in the [place] without undergoing rigorous, multi-layered testing and approval by the Food and Drug  Administration.  During a public health emergency, however, vaccines can be released for patient administration by the FDA prior to completion of multi-phase clinical trials and approval. This is done by the FDAs granting of Emergency Use Authorization (EUA) when the vaccine meets reasonable thresholds for safety and effectiveness and people are in urgent need of care. Under an EUA, the FDA has found that known and potential benefits outweigh its known and potential risks.  The vaccine for which you are presenting to [facility] has been released under an EUA, which [facility] is honoring in its distribution of the vaccine to the public. While the FDAs authorization indicates its belief that usage is recommended over possible risks, there is still the possibility that unknown risks of the vaccine could exist.  By signing this document, you acknowledge and assume these risks. Further, you waive any and all claims of liability against and hold harmless any [facility] entity or provider for any harm caused to you by said possible unknown risks of the vaccine.        N. [facility]:  As used in this document, [facility] means all [facility] affiliated entities including all health centers, surgery centers, clinics, and hospitals.  It includes more specifically, the following entities: [facility], a not for profit [facility], and its subsidiaries and affiliates, including [facility], [facility], [facility], L.L[facility]aunaC., [facility], [facility], [facility], [facility]una[facility]., [facility], L.L[facility]aunaC., [facility].d/b/a [facility], LShaunaLSTEFFEN.d/b/a [facility] [employer], [employer] As manager of [facility], [facility], [facility] [employer]  [employer], L.L.C.d/b/a [facility], [facility]., [facility], L.L.C., and [facility] as manager of [facility].                                                                Patient/Legal Guardian Signature                                                    This signature was collected at 11/10/2022      Macy Mackay/Self                                                                            Printed Name/Relationship to Patient                                                [facility] complies with applicable Federal civil rights laws and does not discriminate on the basis of race, color, national origin, age, disability, or sex.          ATENCIÓN: si habla español, tiene a mc disposición servicios gratuitos de asistencia lingüística. Sarah silva [phone].         CHÚ Ý: N?u b?n nói Ti?ng Vi?t, có các d?ch v? h? tr? ngôn ng? mi?n phí dành cho b?n. G?i s? [phone].              REGISTRATION  AUTHORIZATION

## 2023-03-30 NOTE — PROGRESS NOTES
Population Health Outreach.    Working BCS report  Attempted to reach patient regarding overdue mammogram. No answer, lvm to return call.

## 2023-06-12 ENCOUNTER — OFFICE VISIT (OUTPATIENT)
Dept: FAMILY MEDICINE | Facility: CLINIC | Age: 73
End: 2023-06-12
Payer: MEDICARE

## 2023-06-12 VITALS
WEIGHT: 134.63 LBS | HEIGHT: 65 IN | BODY MASS INDEX: 22.43 KG/M2 | SYSTOLIC BLOOD PRESSURE: 130 MMHG | HEART RATE: 76 BPM | OXYGEN SATURATION: 97 % | TEMPERATURE: 98 F | DIASTOLIC BLOOD PRESSURE: 76 MMHG

## 2023-06-12 DIAGNOSIS — E78.9 LIPID DISORDER: ICD-10-CM

## 2023-06-12 DIAGNOSIS — F41.9 ANXIETY: ICD-10-CM

## 2023-06-12 DIAGNOSIS — E11.9 TYPE 2 DIABETES MELLITUS WITHOUT COMPLICATION, WITHOUT LONG-TERM CURRENT USE OF INSULIN: ICD-10-CM

## 2023-06-12 DIAGNOSIS — Z98.84 BARIATRIC SURGERY STATUS: ICD-10-CM

## 2023-06-12 DIAGNOSIS — Z12.31 SCREENING MAMMOGRAM FOR BREAST CANCER: ICD-10-CM

## 2023-06-12 DIAGNOSIS — M72.0 DUPUYTREN'S DISEASE OF PALM: Primary | ICD-10-CM

## 2023-06-12 PROCEDURE — 1101F PT FALLS ASSESS-DOCD LE1/YR: CPT | Mod: CPTII,S$GLB,, | Performed by: FAMILY MEDICINE

## 2023-06-12 PROCEDURE — 1126F PR PAIN SEVERITY QUANTIFIED, NO PAIN PRESENT: ICD-10-PCS | Mod: CPTII,S$GLB,, | Performed by: FAMILY MEDICINE

## 2023-06-12 PROCEDURE — 3288F PR FALLS RISK ASSESSMENT DOCUMENTED: ICD-10-PCS | Mod: CPTII,S$GLB,, | Performed by: FAMILY MEDICINE

## 2023-06-12 PROCEDURE — 1101F PR PT FALLS ASSESS DOC 0-1 FALLS W/OUT INJ PAST YR: ICD-10-PCS | Mod: CPTII,S$GLB,, | Performed by: FAMILY MEDICINE

## 2023-06-12 PROCEDURE — 99214 OFFICE O/P EST MOD 30 MIN: CPT | Mod: S$GLB,,, | Performed by: FAMILY MEDICINE

## 2023-06-12 PROCEDURE — 3008F PR BODY MASS INDEX (BMI) DOCUMENTED: ICD-10-PCS | Mod: CPTII,S$GLB,, | Performed by: FAMILY MEDICINE

## 2023-06-12 PROCEDURE — 1159F PR MEDICATION LIST DOCUMENTED IN MEDICAL RECORD: ICD-10-PCS | Mod: CPTII,S$GLB,, | Performed by: FAMILY MEDICINE

## 2023-06-12 PROCEDURE — 1126F AMNT PAIN NOTED NONE PRSNT: CPT | Mod: CPTII,S$GLB,, | Performed by: FAMILY MEDICINE

## 2023-06-12 PROCEDURE — 3075F SYST BP GE 130 - 139MM HG: CPT | Mod: CPTII,S$GLB,, | Performed by: FAMILY MEDICINE

## 2023-06-12 PROCEDURE — 3075F PR MOST RECENT SYSTOLIC BLOOD PRESS GE 130-139MM HG: ICD-10-PCS | Mod: CPTII,S$GLB,, | Performed by: FAMILY MEDICINE

## 2023-06-12 PROCEDURE — 3078F DIAST BP <80 MM HG: CPT | Mod: CPTII,S$GLB,, | Performed by: FAMILY MEDICINE

## 2023-06-12 PROCEDURE — 1159F MED LIST DOCD IN RCRD: CPT | Mod: CPTII,S$GLB,, | Performed by: FAMILY MEDICINE

## 2023-06-12 PROCEDURE — 99214 PR OFFICE/OUTPT VISIT, EST, LEVL IV, 30-39 MIN: ICD-10-PCS | Mod: S$GLB,,, | Performed by: FAMILY MEDICINE

## 2023-06-12 PROCEDURE — 3078F PR MOST RECENT DIASTOLIC BLOOD PRESSURE < 80 MM HG: ICD-10-PCS | Mod: CPTII,S$GLB,, | Performed by: FAMILY MEDICINE

## 2023-06-12 PROCEDURE — 3008F BODY MASS INDEX DOCD: CPT | Mod: CPTII,S$GLB,, | Performed by: FAMILY MEDICINE

## 2023-06-12 PROCEDURE — 3288F FALL RISK ASSESSMENT DOCD: CPT | Mod: CPTII,S$GLB,, | Performed by: FAMILY MEDICINE

## 2023-06-12 NOTE — PATIENT INSTRUCTIONS
Get mammogram in [facility]  Get bloodwork with Dr. Hampton  Get pneumonia shot today

## 2023-06-12 NOTE — PROGRESS NOTES
"    [facility] - [place], [place]    Family Medicine Office Visit    Chief Complaint   Patient presents with    Follow-up     Requesting pneumonia, Flu shot and labs.        HPI:  72 female with Dupuytren's disease that tends to have negative effects on pain and joint issues.    Diabetes controlled with A1C at goal  History of bariatric surgery - will be having follow up with Dr. Hampton and will be getting bloodwork as well  Mood stable on medication    Needs mammogram      ROS: as above    Vitals:    06/12/23 1151   BP: 130/76   Pulse: 76   Temp: 97.5 °F (36.4 °C)   SpO2: 97%   Weight: 61.1 kg (134 lb 9.6 oz)   Height: 5' 5" (1.651 m)      Body mass index is 22.4 kg/m².      General:  AOx3, well nourished and developed in no acute distress  Eyes:  PERRLA, EOMI, vision intact grossly  ENT:  normal hearing, moist oral mucosa  Neck:  trachea midline with no masses or thyromegaly  Heart:  RRR, no murmurs.  No edema noted, extremities warm and well perfused  Lungs:  clear to auscultation bilaterally with symmetric chest movement  Abdomen:  Soft, nontender, nondistended.  Normal bowel sounds  Musculoskeletal:  Normal gait.  Normal posture.  Normal muscular development with no joint swelling.  Neurological:  CN II-XII grossly intact. Symmetric strength and sensation  Psych:  Normal mood and affect.  Able to demonstrate good judgement and personal insight.      Assessment/Plan:    1. Dupuytren's disease of palm    2. Lipid disorder    3. Type 2 diabetes mellitus without complication, without long-term current use of insulin    4. Bariatric surgery status    5. Anxiety    6. Screening mammogram for breast cancer  -     Mammo Digital Screening Bilat; Future; Expected date: 06/12/2023    Other orders  -     Pneumococcal conjugate vaccine 20 (PCV20) *Preferred* (PREVNAR 20) - (IM)        Stable  Stable - has bloodwork upcoming  Will get bloodwork upcoming  As above  Stable on medication  Get " mammogram

## 2023-06-19 ENCOUNTER — PATIENT OUTREACH (OUTPATIENT)
Dept: ADMINISTRATIVE | Facility: HOSPITAL | Age: 73
End: 2023-06-19
Payer: MEDICARE

## 2023-06-19 NOTE — LETTER
AUTHORIZATION FOR RELEASE OF   CONFIDENTIAL INFORMATION    Soy Eisenberg MD    We are seeing Macy Mackay, date of birth 1950, in the clinic at [facility]. Jose Miguel Gottlieb MD is the patient's PCP. Macy Mackay has an outstanding lab/procedure at the time we reviewed her chart. In order to help keep her health information updated, she has authorized us to request the following medical record(s):          COLONOSCOPY            [facility], Michael P Hulin, MD, [phone]     If you have any questions, please contact Dianelys Cano, Care Coordinator   at [phone].               Patient Name: Macy Mackay  : 1950  Patient Phone #: [phone]

## 2023-06-21 ENCOUNTER — PATIENT OUTREACH (OUTPATIENT)
Dept: ADMINISTRATIVE | Facility: HOSPITAL | Age: 73
End: 2023-06-21
Payer: MEDICARE

## 2023-06-21 NOTE — PROGRESS NOTES
Population Health Chart Review & Patient Outreach Details:     Reason for Outreach Encounter:     [x]  Non-Compliant Report   []  Payor Report (Humana, PHN, BCBS, MSSP, MCIP, C, etc.)   []  Pre-Visit Chart Review     Updates Requested / Reviewed:     [x]  Care Everywhere    [x]     []  External Sources (LabCorp, Quest, DIS, etc.)   []  Care Team Updated    Patient Outreach Method:    [x]  Telephone Outreach Completed   [] Successful   [] Left Voicemail   [x] Unable to Contact (wrong number, no voicemail)  []  [facility] Outreach Sent  []  Letter Outreach Mailed  []  Fax Sent for External Records  []  External Records Upload    Health Maintenance Topics Addressed and Outreach Outcomes / Actions Taken:        [x]      Breast Cancer Screening []  Mammo Scheduled      []  External Records Requested     []  Added Reminder to Complete to Upcoming Primary Care Appt Notes     []  Patient Declined     []  Patient Will Call Back to Schedule     []  Patient Will Schedule with External Provider / Order Routed if Applicable             []       Cervical Cancer Screening []  Pap Scheduled      []  External Records Requested     []  Added Reminder to Complete to Upcoming Primary Care Appt Notes     []  Patient Declined     []  Patient Will Call Back to Schedule     []  Patient Will Schedule with External Provider               []          Colorectal Cancer Screening []  Colonoscopy Case Request or Referral Placed     []  External Records Requested     []  Added Reminder to Complete to Upcoming Primary Care Appt Notes     []  Patient Declined     []  Patient Will Call Back to Schedule     []  Patient Will Schedule with External Provider     []  Fit Kit Mailed (add the SmartPhrase under additional notes)     []  Reminded Patient to Complete Home Test             []      Diabetic Eye Exam []  Eye Camera Scheduled or Optometry Referral Placed     []  External Records Requested     []  Added Reminder to Complete to  Upcoming Primary Care Appt Notes     []  Patient Declined     []  Patient Will Call Back to Schedule     []  Patient Will Schedule with External Provider             []      Blood Pressure Control []  Primary Care Follow Up Visit Scheduled     []  Remote Blood Pressure Reading Captured     []  Added Reminder to Complete to Upcoming Primary Care Appt Notes     []  Patient Declined     []  Patient Will Call Back / Patient Will Send Portal Message with Reading     []  Patient Will Call Back to Schedule Provider Visit             []       HbA1c & Other Labs []  Lab Appt Scheduled for Due Labs     []  Primary Care Follow Up Visit Scheduled      []  Reminded Patient to Complete Home Test     []  Added Reminder to Complete to Upcoming Primary Care Appt Notes     []  Patient Declined     []  Patient Will Call Back to Schedule     []  Patient Will Schedule with External Provider / Order Routed if Applicable           []    Schedule Primary Care Appt []  Primary Care Appt Scheduled     []  Patient Declined     []  Patient Will Call Back to Schedule     []  Pt Established with External Provider & Updated Care Team             []      Medication Adherence []  Primary Care Appointment Scheduled     []  Added Reminder to Upcoming Primary Care Appt Notes     []  Patient Reminded to  Prescription     []  Patient Declined, Provider Notified if Needed     []  Sent Provider Message to Review and/or Add Exclusion to Problem List             []      Osteoporosis Screening []  DXA Appointment Scheduled     []  External Records Requested     []  Added Reminder to Complete to Upcoming Primary Care Appt Notes     []  Patient Declined     []  Patient Will Call Back to Schedule     []  Patient Will Schedule with External Provider / Order Routed if Applicable     Additional Care Coordinator Notes:         Further Action Needed If Patient Returns Outreach:

## 2023-06-21 NOTE — PROGRESS NOTES
Population Health Chart Review & Patient Outreach Details:     Reason for Outreach Encounter:     []  Non-Compliant Report   []  Payor Report (Humana, PHN, BCBS, MSSP, MCIP, UHC, etc.)   []  Pre-Visit Chart Review     Updates Requested / Reviewed:     []  Care Everywhere    []     []  External Sources (LabCorp, Quest, DIS, etc.)   [x]  Care Team Updated    Patient Outreach Method:    []  Telephone Outreach Completed   [] Successful   [] Left Voicemail   [] Unable to Contact (wrong number, no voicemail)  []  [facility] Outreach Sent  []  Letter Outreach Mailed  []  Fax Sent for External Records  [x]  External Records Upload    Health Maintenance Topics Addressed and Outreach Outcomes / Actions Taken:        []      Breast Cancer Screening []  Mammo Scheduled      []  External Records Requested     []  Added Reminder to Complete to Upcoming Primary Care Appt Notes     []  Patient Declined     []  Patient Will Call Back to Schedule     []  Patient Will Schedule with External Provider / Order Routed if Applicable             []       Cervical Cancer Screening []  Pap Scheduled      []  External Records Requested     []  Added Reminder to Complete to Upcoming Primary Care Appt Notes     []  Patient Declined     []  Patient Will Call Back to Schedule     []  Patient Will Schedule with External Provider               [x]          Colorectal Cancer Screening []  Colonoscopy Case Request or Referral Placed     []  External Records Requested     []  Added Reminder to Complete to Upcoming Primary Care Appt Notes     []  Patient Declined     []  Patient Will Call Back to Schedule     []  Patient Will Schedule with External Provider     []  Fit Kit Mailed (add the SmartPhrase under additional notes)     []  Reminded Patient to Complete Home Test             []      Diabetic Eye Exam []  Eye Camera Scheduled or Optometry Referral Placed     []  External Records Requested     []  Added Reminder to Complete to  Upcoming Primary Care Appt Notes     []  Patient Declined     []  Patient Will Call Back to Schedule     []  Patient Will Schedule with External Provider             []      Blood Pressure Control []  Primary Care Follow Up Visit Scheduled     []  Remote Blood Pressure Reading Captured     []  Added Reminder to Complete to Upcoming Primary Care Appt Notes     []  Patient Declined     []  Patient Will Call Back / Patient Will Send Portal Message with Reading     []  Patient Will Call Back to Schedule Provider Visit             []       HbA1c & Other Labs []  Lab Appt Scheduled for Due Labs     []  Primary Care Follow Up Visit Scheduled      []  Reminded Patient to Complete Home Test     []  Added Reminder to Complete to Upcoming Primary Care Appt Notes     []  Patient Declined     []  Patient Will Call Back to Schedule     []  Patient Will Schedule with External Provider / Order Routed if Applicable           []    Schedule Primary Care Appt []  Primary Care Appt Scheduled     []  Patient Declined     []  Patient Will Call Back to Schedule     []  Pt Established with External Provider & Updated Care Team             []      Medication Adherence []  Primary Care Appointment Scheduled     []  Added Reminder to Upcoming Primary Care Appt Notes     []  Patient Reminded to  Prescription     []  Patient Declined, Provider Notified if Needed     []  Sent Provider Message to Review and/or Add Exclusion to Problem List             []      Osteoporosis Screening []  DXA Appointment Scheduled     []  External Records Requested     []  Added Reminder to Complete to Upcoming Primary Care Appt Notes     []  Patient Declined     []  Patient Will Call Back to Schedule     []  Patient Will Schedule with External Provider / Order Routed if Applicable     Additional Care Coordinator Notes:     COLONOSCOPY HYPERLINKED    Further Action Needed If Patient Returns Outreach:

## 2023-06-21 NOTE — PROGRESS NOTES
Population Health Chart Review & Patient Outreach Details:     Reason for Outreach Encounter:     [x]  Non-Compliant Report   []  Payor Report (Humana, PHN, BCBS, MSSP, MCIP, UHC, etc.)   []  Pre-Visit Chart Review     Updates Requested / Reviewed:     []  Care Everywhere    []     []  External Sources ([facility], Quest, DIS, etc.)   []  Care Team Updated    Patient Outreach Method:    [x]  Telephone Outreach Completed   [] Successful   [x] Left Voicemail   [] Unable to Contact (wrong number, no voicemail)  []  [employer] Portal Outreach Sent  []  Letter Outreach Mailed  []  Fax Sent for External Records  []  External Records Upload    Health Maintenance Topics Addressed and Outreach Outcomes / Actions Taken:        []      Breast Cancer Screening []  Mammo Scheduled      []  External Records Requested     []  Added Reminder to Complete to Upcoming Primary Care Appt Notes     []  Patient Declined     []  Patient Will Call Back to Schedule     []  Patient Will Schedule with External Provider / Order Routed if Applicable             []       Cervical Cancer Screening []  Pap Scheduled      []  External Records Requested     []  Added Reminder to Complete to Upcoming Primary Care Appt Notes     []  Patient Declined     []  Patient Will Call Back to Schedule     []  Patient Will Schedule with External Provider               []          Colorectal Cancer Screening []  Colonoscopy Case Request or Referral Placed     []  External Records Requested     []  Added Reminder to Complete to Upcoming Primary Care Appt Notes     []  Patient Declined     []  Patient Will Call Back to Schedule     []  Patient Will Schedule with External Provider     []  Fit Kit Mailed (add the SmartPhrase under additional notes)     []  Reminded Patient to Complete Home Test             []      Diabetic Eye Exam []  Eye Camera Scheduled or Optometry Referral Placed     []  External Records Requested     []  Added Reminder to Complete to  Upcoming Primary Care Appt Notes     []  Patient Declined     []  Patient Will Call Back to Schedule     []  Patient Will Schedule with External Provider             []      Blood Pressure Control []  Primary Care Follow Up Visit Scheduled     []  Remote Blood Pressure Reading Captured     []  Added Reminder to Complete to Upcoming Primary Care Appt Notes     []  Patient Declined     []  Patient Will Call Back / Patient Will Send Portal Message with Reading     []  Patient Will Call Back to Schedule Provider Visit             [x]       HbA1c & Other Labs []  Lab Appt Scheduled for Due Labs     []  Primary Care Follow Up Visit Scheduled      []  Reminded Patient to Complete Home Test     []  Added Reminder to Complete to Upcoming Primary Care Appt Notes     []  Patient Declined     []  Patient Will Call Back to Schedule     []  Patient Will Schedule with External Provider / Order Routed if Applicable           []    Schedule Primary Care Appt []  Primary Care Appt Scheduled     []  Patient Declined     []  Patient Will Call Back to Schedule     []  Pt Established with External Provider & Updated Care Team             []      Medication Adherence []  Primary Care Appointment Scheduled     []  Added Reminder to Upcoming Primary Care Appt Notes     []  Patient Reminded to  Prescription     []  Patient Declined, Provider Notified if Needed     []  Sent Provider Message to Review and/or Add Exclusion to Problem List             []      Osteoporosis Screening []  DXA Appointment Scheduled     []  External Records Requested     []  Added Reminder to Complete to Upcoming Primary Care Appt Notes     []  Patient Declined     []  Patient Will Call Back to Schedule     []  Patient Will Schedule with External Provider / Order Routed if Applicable     Additional Care Coordinator Notes:     Left message to return call to clinic to schedule labs    Further Action Needed If Patient Returns Outreach:

## 2023-06-22 ENCOUNTER — PATIENT OUTREACH (OUTPATIENT)
Dept: ADMINISTRATIVE | Facility: HOSPITAL | Age: 73
End: 2023-06-22
Payer: MEDICARE

## 2023-06-22 NOTE — PROGRESS NOTES
Population Health Chart Review & Patient Outreach Details:     Reason for Outreach Encounter:     []  Non-Compliant Report   []  Payor Report (Humana, PHN, BCBS, MSSP, MCIP, UHC, etc.)   []  Pre-Visit Chart Review     Updates Requested / Reviewed:     []  Care Everywhere    []     []  External Sources (LabCorp, Quest, DIS, etc.)   [x]  Care Team Updated    Patient Outreach Method:    []  Telephone Outreach Completed   [] Successful   [] Left Voicemail   [] Unable to Contact (wrong number, no voicemail)  []  [facility] Portal Outreach Sent  []  Letter Outreach Mailed  []  Fax Sent for External Records  [x]  External Records Upload    Health Maintenance Topics Addressed and Outreach Outcomes / Actions Taken:        []      Breast Cancer Screening []  Mammo Scheduled      []  External Records Requested     []  Added Reminder to Complete to Upcoming Primary Care Appt Notes     []  Patient Declined     []  Patient Will Call Back to Schedule     []  Patient Will Schedule with External Provider / Order Routed if Applicable             []       Cervical Cancer Screening []  Pap Scheduled      []  External Records Requested     []  Added Reminder to Complete to Upcoming Primary Care Appt Notes     []  Patient Declined     []  Patient Will Call Back to Schedule     []  Patient Will Schedule with External Provider               [x]          Colorectal Cancer Screening []  Colonoscopy Case Request or Referral Placed     []  External Records Requested     []  Added Reminder to Complete to Upcoming Primary Care Appt Notes     []  Patient Declined     []  Patient Will Call Back to Schedule     []  Patient Will Schedule with External Provider     []  Fit Kit Mailed (add the SmartPhrase under additional notes)     []  Reminded Patient to Complete Home Test             []      Diabetic Eye Exam []  Eye Camera Scheduled or Optometry Referral Placed     []  External Records Requested     []  Added Reminder to Complete to  Upcoming Primary Care Appt Notes     []  Patient Declined     []  Patient Will Call Back to Schedule     []  Patient Will Schedule with External Provider             []      Blood Pressure Control []  Primary Care Follow Up Visit Scheduled     []  Remote Blood Pressure Reading Captured     []  Added Reminder to Complete to Upcoming Primary Care Appt Notes     []  Patient Declined     []  Patient Will Call Back / Patient Will Send Portal Message with Reading     []  Patient Will Call Back to Schedule Provider Visit             []       HbA1c & Other Labs []  Lab Appt Scheduled for Due Labs     []  Primary Care Follow Up Visit Scheduled      []  Reminded Patient to Complete Home Test     []  Added Reminder to Complete to Upcoming Primary Care Appt Notes     []  Patient Declined     []  Patient Will Call Back to Schedule     []  Patient Will Schedule with External Provider / Order Routed if Applicable           []    Schedule Primary Care Appt []  Primary Care Appt Scheduled     []  Patient Declined     []  Patient Will Call Back to Schedule     []  Pt Established with External Provider & Updated Care Team             []      Medication Adherence []  Primary Care Appointment Scheduled     []  Added Reminder to Upcoming Primary Care Appt Notes     []  Patient Reminded to  Prescription     []  Patient Declined, Provider Notified if Needed     []  Sent Provider Message to Review and/or Add Exclusion to Problem List             []      Osteoporosis Screening []  DXA Appointment Scheduled     []  External Records Requested     []  Added Reminder to Complete to Upcoming Primary Care Appt Notes     []  Patient Declined     []  Patient Will Call Back to Schedule     []  Patient Will Schedule with External Provider / Order Routed if Applicable     Additional Care Coordinator Notes:     COLONOSCOPY HYPERLINKED    Further Action Needed If Patient Returns Outreach:

## 2023-07-28 ENCOUNTER — TELEPHONE (OUTPATIENT)
Dept: FAMILY MEDICINE | Facility: CLINIC | Age: 73
End: 2023-07-28
Payer: MEDICARE

## 2023-07-28 NOTE — TELEPHONE ENCOUNTER
Order in chart faxed over.     Patient requesting orders be faxed over to Dr. Hampton.     ----- Message from Ketty Ruiz sent at 7/28/2023  1:56 PM CDT -----  Contact: PT  Type:  Needs Medical Advice    Who Called: PT   Symptoms (please be specific): LAB WORK WILL BE COMBINED  WITH THE DR. HAMPTON [facility] IN [place], [place] ORDERS  08/01/23    Would the patient rather a call back or a response via [facility]? CALL  Best Call Back Number:  [phone] (home)     Additional Information: THANK YOU

## 2023-08-02 ENCOUNTER — TELEPHONE (OUTPATIENT)
Dept: FAMILY MEDICINE | Facility: CLINIC | Age: 73
End: 2023-08-02
Payer: MEDICARE

## 2023-08-02 DIAGNOSIS — Z12.31 ENCOUNTER FOR SCREENING MAMMOGRAM FOR BREAST CANCER: Primary | ICD-10-CM

## 2023-08-02 NOTE — TELEPHONE ENCOUNTER
----- Message from Audra Mcdermott sent at 8/2/2023  2:45 PM CDT -----  Regarding: pt called  Name of Who is Calling: NILDA BRAMBILA [[id number]]      What is the request in detail: pt is requesting mammogram orders be placed. Please advise       Can the clinic reply by [facility]: No      What Number to Call Back if not in [facility]: Telephone Information:       [phone] pt is requesting a text . She says something is wrong with her phone.

## 2023-09-05 ENCOUNTER — HOSPITAL ENCOUNTER (OUTPATIENT)
Dept: RADIOLOGY | Facility: HOSPITAL | Age: 73
Discharge: HOME | End: 2023-09-05
Attending: FAMILY MEDICINE
Payer: MEDICARE

## 2023-09-05 DIAGNOSIS — Z12.31 ENCOUNTER FOR SCREENING MAMMOGRAM FOR BREAST CANCER: ICD-10-CM

## 2023-09-05 PROCEDURE — 77067 MAMMO DIGITAL SCREENING BILAT WITH TOMO: ICD-10-PCS | Mod: 26,,, | Performed by: RADIOLOGY

## 2023-09-05 PROCEDURE — 77063 MAMMO DIGITAL SCREENING BILAT WITH TOMO: ICD-10-PCS | Mod: 26,,, | Performed by: RADIOLOGY

## 2023-09-05 PROCEDURE — 77067 SCR MAMMO BI INCL CAD: CPT | Mod: TC

## 2023-09-05 PROCEDURE — 77067 SCR MAMMO BI INCL CAD: CPT | Mod: 26,,, | Performed by: RADIOLOGY

## 2023-09-05 PROCEDURE — 77063 BREAST TOMOSYNTHESIS BI: CPT | Mod: 26,,, | Performed by: RADIOLOGY

## 2023-09-13 ENCOUNTER — PATIENT OUTREACH (OUTPATIENT)
Dept: ADMINISTRATIVE | Facility: HOSPITAL | Age: 73
End: 2023-09-13
Payer: MEDICARE

## 2023-09-13 NOTE — LETTER
September 13, 2023    Macy Mackay  [address] [place] [address]  [place] [address]             [facility]  [address]  [facility] [address]  Phone: [phone] Dear MsShauna Thompson,      Good morning!!    I am reaching out to you because you are due for a Hemoglobin A1C. It is important to have this done so we can make sure your Diabetes is under control. Can I please schedule this for you? You can also call [phone] to schedule this lab.    Thanks so much and have a great day!    Danae OSUNA LPN [facility]  [address] [address]  P- [phone]

## 2023-09-13 NOTE — PROGRESS NOTES
Hemoglobin A1C non-compliance GAP report-Unable to leave a VM, letter sent out regarding pt's overdue Hemoglobin A1C.

## 2023-09-13 NOTE — TELEPHONE ENCOUNTER
Last office visit: 6/12/2023  Rx Auth Request  Received: Today  Susie Reina Michael P., MD Staff  Caller: Unspecified (Today, 11:28 AM)

## 2023-09-14 RX ORDER — DULOXETIN HYDROCHLORIDE 60 MG/1
CAPSULE, DELAYED RELEASE ORAL
Qty: 60 CAPSULE | Refills: 6 | Status: SHIPPED | OUTPATIENT
Start: 2023-09-14 | End: 2023-10-04 | Stop reason: SDUPTHER

## 2023-09-21 DIAGNOSIS — Z78.0 MENOPAUSE: ICD-10-CM

## 2023-10-04 NOTE — TELEPHONE ENCOUNTER
Last office visit: 6/12/2023  ----- Message from Giselle Salazar sent at 10/4/2023  3:10 PM CDT -----  Contact: self  Type: RX Refill Request        Who Called: Patient   Refill or New Rx: refill  RX Name and Strength:   DULoxetine (CYMBALTA) 60 MG capsule  estradiol (ESTRACE) 0.5 MG tablet  How is the patient currently taking it? (ex. 1XDay): as directed   Is this a 30 day or 90 day RX: 90  Preferred Pharmacy with phone number:   [facility] - [place] - [address]  [address]  [place] [address]  Phone: [phone] Fax: [phone]  Local or Mail Order: local   Ordering Provider: Dr. Chery Almonte Call Back Number: [phone]  Additional Information: Patient states she needs a refill wants  to do 90 days. Pt states she also wants to inquire about getting back on estradiol (ESTRACE) for  the lowest garde she can get. Her hormones are acting up again.  Pt states VM not set up yet plz leave something on the portal. Thanks

## 2023-10-05 RX ORDER — DULOXETIN HYDROCHLORIDE 60 MG/1
CAPSULE, DELAYED RELEASE ORAL
Qty: 60 CAPSULE | Refills: 2 | Status: SHIPPED | OUTPATIENT
Start: 2023-10-05

## 2023-10-18 ENCOUNTER — TELEPHONE (OUTPATIENT)
Dept: FAMILY MEDICINE | Facility: CLINIC | Age: 73
End: 2023-10-18
Payer: MEDICARE

## 2023-10-18 NOTE — TELEPHONE ENCOUNTER
----- Message from Ana Leigh sent at 10/18/2023 11:03 AM CDT -----  Name of Who is Calling: NILDA BRAMBILA [[id number]]       What is the request in detail: pt stated she is needing clearance for a procedure. Pt procedure has not been scheduled        Can the clinic reply by [facility]: no        What Number to Call Back if not in [facility]: [phone] (home)

## 2023-10-26 ENCOUNTER — LAB VISIT (OUTPATIENT)
Dept: LAB | Facility: CLINIC | Age: 73
End: 2023-10-26
Payer: MEDICARE

## 2023-10-26 ENCOUNTER — OFFICE VISIT (OUTPATIENT)
Dept: FAMILY MEDICINE | Facility: CLINIC | Age: 73
End: 2023-10-26
Payer: MEDICARE

## 2023-10-26 VITALS
WEIGHT: 136 LBS | DIASTOLIC BLOOD PRESSURE: 80 MMHG | TEMPERATURE: 99 F | BODY MASS INDEX: 22.66 KG/M2 | SYSTOLIC BLOOD PRESSURE: 120 MMHG | OXYGEN SATURATION: 98 % | HEART RATE: 74 BPM | HEIGHT: 65 IN

## 2023-10-26 DIAGNOSIS — M72.0 DUPUYTREN'S DISEASE OF PALM: Primary | ICD-10-CM

## 2023-10-26 DIAGNOSIS — Z98.84 BARIATRIC SURGERY STATUS: ICD-10-CM

## 2023-10-26 DIAGNOSIS — E78.9 LIPID DISORDER: ICD-10-CM

## 2023-10-26 DIAGNOSIS — E11.9 TYPE 2 DIABETES MELLITUS WITHOUT COMPLICATION, WITHOUT LONG-TERM CURRENT USE OF INSULIN: ICD-10-CM

## 2023-10-26 DIAGNOSIS — K21.9 GASTROESOPHAGEAL REFLUX DISEASE, UNSPECIFIED WHETHER ESOPHAGITIS PRESENT: ICD-10-CM

## 2023-10-26 DIAGNOSIS — Z01.810 PREOP CARDIOVASCULAR EXAM: ICD-10-CM

## 2023-10-26 LAB
ALBUMIN SERPL BCP-MCNC: 3.9 G/DL (ref 3.5–5.2)
ALP SERPL-CCNC: 134 U/L (ref 55–135)
ALT SERPL W/O P-5'-P-CCNC: 49 U/L (ref 10–44)
ANION GAP SERPL CALC-SCNC: 11 MMOL/L (ref 8–16)
AST SERPL-CCNC: 39 U/L (ref 10–40)
BASOPHILS # BLD AUTO: 0.13 K/UL (ref 0–0.2)
BASOPHILS NFR BLD: 1.9 % (ref 0–1.9)
BILIRUB SERPL-MCNC: 0.2 MG/DL (ref 0.1–1)
BUN SERPL-MCNC: 16 MG/DL (ref 8–23)
CALCIUM SERPL-MCNC: 9.1 MG/DL (ref 8.7–10.5)
CHLORIDE SERPL-SCNC: 98 MMOL/L (ref 95–110)
CHOLEST SERPL-MCNC: 227 MG/DL (ref 120–199)
CHOLEST/HDLC SERPL: 3.5 {RATIO} (ref 2–5)
CO2 SERPL-SCNC: 25 MMOL/L (ref 23–29)
CREAT SERPL-MCNC: 0.7 MG/DL (ref 0.5–1.4)
DIFFERENTIAL METHOD: ABNORMAL
EOSINOPHIL # BLD AUTO: 0.3 K/UL (ref 0–0.5)
EOSINOPHIL NFR BLD: 4.4 % (ref 0–8)
ERYTHROCYTE [DISTWIDTH] IN BLOOD BY AUTOMATED COUNT: 12.8 % (ref 11.5–14.5)
EST. GFR  (NO RACE VARIABLE): >60 ML/MIN/1.73 M^2
ESTIMATED AVG GLUCOSE: 94 MG/DL (ref 68–131)
GLUCOSE SERPL-MCNC: 101 MG/DL (ref 70–110)
HBA1C MFR BLD: 4.9 % (ref 4–5.6)
HCT VFR BLD AUTO: 42 % (ref 37–48.5)
HDLC SERPL-MCNC: 65 MG/DL (ref 40–75)
HDLC SERPL: 28.6 % (ref 20–50)
HGB BLD-MCNC: 14.1 G/DL (ref 12–16)
IMM GRANULOCYTES # BLD AUTO: 0.02 K/UL (ref 0–0.04)
IMM GRANULOCYTES NFR BLD AUTO: 0.3 % (ref 0–0.5)
LDLC SERPL CALC-MCNC: 133 MG/DL (ref 63–159)
LYMPHOCYTES # BLD AUTO: 3.5 K/UL (ref 1–4.8)
LYMPHOCYTES NFR BLD: 50.5 % (ref 18–48)
MCH RBC QN AUTO: 32 PG (ref 27–31)
MCHC RBC AUTO-ENTMCNC: 33.6 G/DL (ref 32–36)
MCV RBC AUTO: 96 FL (ref 82–98)
MONOCYTES # BLD AUTO: 0.4 K/UL (ref 0.3–1)
MONOCYTES NFR BLD: 5.1 % (ref 4–15)
NEUTROPHILS # BLD AUTO: 2.6 K/UL (ref 1.8–7.7)
NEUTROPHILS NFR BLD: 37.8 % (ref 38–73)
NONHDLC SERPL-MCNC: 162 MG/DL
NRBC BLD-RTO: 0 /100 WBC
PLATELET # BLD AUTO: 303 K/UL (ref 150–450)
PMV BLD AUTO: 10.9 FL (ref 9.2–12.9)
POTASSIUM SERPL-SCNC: 4.5 MMOL/L (ref 3.5–5.1)
PROT SERPL-MCNC: 7.2 G/DL (ref 6–8.4)
RBC # BLD AUTO: 4.4 M/UL (ref 4–5.4)
SODIUM SERPL-SCNC: 134 MMOL/L (ref 136–145)
TRIGL SERPL-MCNC: 145 MG/DL (ref 30–150)
TSH SERPL DL<=0.005 MIU/L-ACNC: 2.39 UIU/ML (ref 0.4–4)
WBC # BLD AUTO: 6.83 K/UL (ref 3.9–12.7)

## 2023-10-26 PROCEDURE — 99214 OFFICE O/P EST MOD 30 MIN: CPT | Mod: S$GLB,,, | Performed by: FAMILY MEDICINE

## 2023-10-26 PROCEDURE — 1159F MED LIST DOCD IN RCRD: CPT | Mod: CPTII,S$GLB,, | Performed by: FAMILY MEDICINE

## 2023-10-26 PROCEDURE — 3074F SYST BP LT 130 MM HG: CPT | Mod: CPTII,S$GLB,, | Performed by: FAMILY MEDICINE

## 2023-10-26 PROCEDURE — 3074F PR MOST RECENT SYSTOLIC BLOOD PRESSURE < 130 MM HG: ICD-10-PCS | Mod: CPTII,S$GLB,, | Performed by: FAMILY MEDICINE

## 2023-10-26 PROCEDURE — 1126F AMNT PAIN NOTED NONE PRSNT: CPT | Mod: CPTII,S$GLB,, | Performed by: FAMILY MEDICINE

## 2023-10-26 PROCEDURE — 1101F PT FALLS ASSESS-DOCD LE1/YR: CPT | Mod: CPTII,S$GLB,, | Performed by: FAMILY MEDICINE

## 2023-10-26 PROCEDURE — 80053 COMPREHEN METABOLIC PANEL: CPT | Performed by: FAMILY MEDICINE

## 2023-10-26 PROCEDURE — 83036 HEMOGLOBIN GLYCOSYLATED A1C: CPT | Performed by: FAMILY MEDICINE

## 2023-10-26 PROCEDURE — 1101F PR PT FALLS ASSESS DOC 0-1 FALLS W/OUT INJ PAST YR: ICD-10-PCS | Mod: CPTII,S$GLB,, | Performed by: FAMILY MEDICINE

## 2023-10-26 PROCEDURE — 36415 PR COLLECTION VENOUS BLOOD,VENIPUNCTURE: ICD-10-PCS | Mod: ,,, | Performed by: STUDENT IN AN ORGANIZED HEALTH CARE EDUCATION/TRAINING PROGRAM

## 2023-10-26 PROCEDURE — 84443 ASSAY THYROID STIM HORMONE: CPT | Performed by: FAMILY MEDICINE

## 2023-10-26 PROCEDURE — 99214 PR OFFICE/OUTPT VISIT, EST, LEVL IV, 30-39 MIN: ICD-10-PCS | Mod: S$GLB,,, | Performed by: FAMILY MEDICINE

## 2023-10-26 PROCEDURE — 3079F DIAST BP 80-89 MM HG: CPT | Mod: CPTII,S$GLB,, | Performed by: FAMILY MEDICINE

## 2023-10-26 PROCEDURE — 3288F PR FALLS RISK ASSESSMENT DOCUMENTED: ICD-10-PCS | Mod: CPTII,S$GLB,, | Performed by: FAMILY MEDICINE

## 2023-10-26 PROCEDURE — 36415 COLL VENOUS BLD VENIPUNCTURE: CPT | Mod: ,,, | Performed by: STUDENT IN AN ORGANIZED HEALTH CARE EDUCATION/TRAINING PROGRAM

## 2023-10-26 PROCEDURE — 1126F PR PAIN SEVERITY QUANTIFIED, NO PAIN PRESENT: ICD-10-PCS | Mod: CPTII,S$GLB,, | Performed by: FAMILY MEDICINE

## 2023-10-26 PROCEDURE — 3079F PR MOST RECENT DIASTOLIC BLOOD PRESSURE 80-89 MM HG: ICD-10-PCS | Mod: CPTII,S$GLB,, | Performed by: FAMILY MEDICINE

## 2023-10-26 PROCEDURE — 3288F FALL RISK ASSESSMENT DOCD: CPT | Mod: CPTII,S$GLB,, | Performed by: FAMILY MEDICINE

## 2023-10-26 PROCEDURE — 85025 COMPLETE CBC W/AUTO DIFF WBC: CPT | Performed by: FAMILY MEDICINE

## 2023-10-26 PROCEDURE — 80061 LIPID PANEL: CPT | Performed by: FAMILY MEDICINE

## 2023-10-26 PROCEDURE — 1159F PR MEDICATION LIST DOCUMENTED IN MEDICAL RECORD: ICD-10-PCS | Mod: CPTII,S$GLB,, | Performed by: FAMILY MEDICINE

## 2023-10-26 NOTE — PROGRESS NOTES
"    [facility]  Primary Care Clin[facility] - [place], [place]    Family Medicine Office Visit    Chief Complaint   Patient presents with    surgery for clearance        HPI:  established patient here for pre-op surgical clearance  History of bariatric surgery - has severe GERD in this setting, and has recurrent ulcer.  Will be having procedure upcoming with Dr. Hampton to correct this - needs lap resection of ulcer    Chronic dupuytren's disease is major limitation in function  Diabetes controlled with A1C at goal  Anxiety stable    Has good functional capacity, and remains physically active      ROS: as above    Vitals:    10/26/23 1304   BP: 120/80   Pulse: 74   Temp: 98.5 °F (36.9 °C)   SpO2: 98%   Weight: 61.7 kg (136 lb)   Height: 5' 5" (1.651 m)      Body mass index is 22.63 kg/m².      General:  AOx3, well nourished and developed in no acute distress  Eyes:  PERRLA, EOMI, vision intact grossly  ENT:  normal hearing, moist oral mucosa  Neck:  trachea midline with no masses or thyromegaly  Heart:  RRR, no murmurs.  No edema noted, extremities warm and well perfused  Lungs:  clear to auscultation bilaterally with symmetric chest movement  Abdomen:  Soft, nontender, nondistended.  Normal bowel sounds  Musculoskeletal:  Normal gait.  Normal posture.  Normal muscular development with no joint swelling.  Neurological:  CN II-XII grossly intact. Symmetric strength and sensation  Psych:  Normal mood and affect.  Able to demonstrate good judgement and personal insight.      Assessment/Plan:    1. Dupuytren's disease of palm    2. Bariatric surgery status    3. Type 2 diabetes mellitus without complication, without long-term current use of insulin    4. Lipid disorder    5. Preop cardiovascular exam      Stable  Get metabolic labs today.  Has surgery upcoming for recurrent ulcer  Check labs  Stable  Clear for surgery - reviewed EKG with good functional capacity.  Labs today.        "

## 2023-10-31 ENCOUNTER — TELEPHONE (OUTPATIENT)
Dept: FAMILY MEDICINE | Facility: CLINIC | Age: 73
End: 2023-10-31
Payer: MEDICARE

## 2023-10-31 NOTE — LETTER
October 31, 2023        Macy Mackay  [address] [address]  [place] [address]             [facility]  [address] [facility] [place] [address]   Patient: Macy Mackay   MR Number: [id number]   YOB: 1950   Date of Visit: 10/31/2023     Dear Dr. Hampton:    Macy was seen in my office on 10/17/2023.  She is cleared for her upcoming surgical procedure.    Sincerely,      Jose Miguel Gottlieb MD            CC  No Recipients    Enclosure

## 2023-11-06 DIAGNOSIS — Z79.890 HORMONE REPLACEMENT THERAPY (HRT): ICD-10-CM

## 2023-11-06 RX ORDER — ESTRADIOL 0.5 MG/1
TABLET ORAL
Qty: 90 TABLET | Refills: 3 | Status: SHIPPED | OUTPATIENT
Start: 2023-11-06

## 2023-11-06 NOTE — TELEPHONE ENCOUNTER
Last office visit: 10/26/2023  ----- Message from Jo Ann Cardenas sent at 11/3/2023 12:55 PM CDT -----  Contact: Patient  Type:  Needs Medical Advice    Who Called: Patient     Pharmacy name and phone #:    [facility] - [place] - [address]  [place] [address]  Phone: [phone] Fax: [phone]      Would the patient rather a call back or a response via [facility]? Call  Best Call Back Number: [phone] (home)     Additional Information: Pharmacy states they did not receive script. Please resend and advise   estradiol (ESTRACE) 0.5 MG tablet

## 2023-12-01 DIAGNOSIS — E11.9 TYPE 2 DIABETES MELLITUS WITHOUT COMPLICATION, UNSPECIFIED WHETHER LONG TERM INSULIN USE: ICD-10-CM

## 2024-03-20 ENCOUNTER — PATIENT OUTREACH (OUTPATIENT)
Dept: ADMINISTRATIVE | Facility: HOSPITAL | Age: 74
End: 2024-03-20
Payer: MEDICARE

## 2024-03-20 NOTE — PROGRESS NOTES
Population Health Chart Review & Patient Outreach Details      Additional [facility] Notes:      The patient is showing as non-compliant on the Statin Therapy Measure     - Patient does not have a current Statin listed in medication list. MSG SENT TO PCP to review                  Updates Requested / Reviewed:      Health Maintenance Topics Overdue:      VBHM Score: 4     Osteoporosis Screening  Urine Screening  Eye Exam  Foot Exam    Influenza Vaccine  Pneumonia Vaccine  Tetanus Vaccine  Shingles/Zoster Vaccine  RSV Vaccine                  Health Maintenance Topic(s) Outreach Outcomes & Actions Taken:    Medication Adherence / Statins - Outreach Outcomes & Actions Taken  : Sent Provider Message to Review to Evaluate Pt for Statin, Add Exclusion Dx Codes, Document Exclusion in Problem List, Change Statin Intensity Level to Moderate or High Intensity if Applicable

## 2024-03-22 DIAGNOSIS — E11.9 TYPE 2 DIABETES MELLITUS WITHOUT COMPLICATION: ICD-10-CM

## 2024-04-25 ENCOUNTER — TELEPHONE (OUTPATIENT)
Dept: FAMILY MEDICINE | Facility: CLINIC | Age: 74
End: 2024-04-25
Payer: MEDICARE

## 2024-04-25 RX ORDER — PRAVASTATIN SODIUM 40 MG/1
40 TABLET ORAL DAILY
Qty: 90 TABLET | Refills: 3 | Status: SHIPPED | OUTPATIENT
Start: 2024-04-25 | End: 2025-04-25

## 2024-04-25 NOTE — TELEPHONE ENCOUNTER
Oni Gottlieb,  Please review this message below from this patient concerning the cholesterol medication answer.  Call placed to pt due to msg left, due to Dr. Gottlieb's orders to inform pt based on her labs she would benefit taking Rx for cholesterol. Pt states she would like to take cholesterol Rx. Sched appt for 5/3/2024 @ 1:45 PM.   Thank you  Signed:  Jamie Logan LPN  ----- Message from Jose Miguel Gottlieb MD sent at 4/25/2024  5:49 AM CDT -----  Regarding: cholesterol  Please inform patient:  based on her labs she would benefit from taking a medication to lower her cholesterol.  I am happy to send this in.

## 2024-05-03 ENCOUNTER — OFFICE VISIT (OUTPATIENT)
Dept: FAMILY MEDICINE | Facility: CLINIC | Age: 74
End: 2024-05-03
Payer: MEDICARE

## 2024-05-03 VITALS
RESPIRATION RATE: 18 BRPM | OXYGEN SATURATION: 96 % | SYSTOLIC BLOOD PRESSURE: 115 MMHG | HEART RATE: 85 BPM | BODY MASS INDEX: 22.49 KG/M2 | WEIGHT: 135 LBS | DIASTOLIC BLOOD PRESSURE: 65 MMHG | HEIGHT: 65 IN

## 2024-05-03 DIAGNOSIS — M72.0 DUPUYTREN'S DISEASE OF PALM: ICD-10-CM

## 2024-05-03 DIAGNOSIS — F41.9 ANXIETY: Primary | ICD-10-CM

## 2024-05-03 DIAGNOSIS — Z79.890 HORMONE REPLACEMENT THERAPY (HRT): ICD-10-CM

## 2024-05-03 DIAGNOSIS — E78.9 LIPID DISORDER: ICD-10-CM

## 2024-05-03 DIAGNOSIS — E11.9 TYPE 2 DIABETES MELLITUS WITHOUT COMPLICATION, WITHOUT LONG-TERM CURRENT USE OF INSULIN: ICD-10-CM

## 2024-05-03 DIAGNOSIS — Z98.84 BARIATRIC SURGERY STATUS: ICD-10-CM

## 2024-05-03 PROCEDURE — 3288F FALL RISK ASSESSMENT DOCD: CPT | Mod: CPTII,S$GLB,, | Performed by: FAMILY MEDICINE

## 2024-05-03 PROCEDURE — 1125F AMNT PAIN NOTED PAIN PRSNT: CPT | Mod: CPTII,S$GLB,, | Performed by: FAMILY MEDICINE

## 2024-05-03 PROCEDURE — 3074F SYST BP LT 130 MM HG: CPT | Mod: CPTII,S$GLB,, | Performed by: FAMILY MEDICINE

## 2024-05-03 PROCEDURE — 3078F DIAST BP <80 MM HG: CPT | Mod: CPTII,S$GLB,, | Performed by: FAMILY MEDICINE

## 2024-05-03 PROCEDURE — G2211 COMPLEX E/M VISIT ADD ON: HCPCS | Mod: S$GLB,,, | Performed by: FAMILY MEDICINE

## 2024-05-03 PROCEDURE — 99214 OFFICE O/P EST MOD 30 MIN: CPT | Mod: S$GLB,,, | Performed by: FAMILY MEDICINE

## 2024-05-03 PROCEDURE — 1100F PTFALLS ASSESS-DOCD GE2>/YR: CPT | Mod: CPTII,S$GLB,, | Performed by: FAMILY MEDICINE

## 2024-05-03 RX ORDER — ESTRADIOL 1 MG/1
1 TABLET ORAL DAILY
Qty: 30 TABLET | Refills: 6 | Status: SHIPPED | OUTPATIENT
Start: 2024-05-03

## 2024-05-03 NOTE — PROGRESS NOTES
"  [facility]  Primary Care Clinics - [place], [place]    Family Medicine Office Visit    Chief Complaint   Patient presents with    Follow-up     Follow up s/p rib fx        HPI:  73 female with recent fall that apparently resulted in rib fracture.  Has chronic dupuytren's diseas  DM stable with history of bariatric surgery  Blood Pressure at goal on medication, with patient reporting prescription compliance  Hyperlipidemia stable on appropriate intensity statin therapy    Needs labs    Sees Dr. Herrera for chronic back pain    ROS: as above    Vitals:    05/03/24 1355   BP: 115/65   BP Location: Right arm   Patient Position: Sitting   BP Method: Medium (Automatic)   Pulse: 85   Resp: 18   SpO2: 96%   Weight: 61.2 kg (135 lb)   Height: 5' 5" (1.651 m)      Body mass index is 22.47 kg/m².      General:  AOx3, well nourished and developed in no acute distress  Eyes:  PERRLA, EOMI, vision intact grossly  ENT:  normal hearing, moist oral mucosa  Neck:  trachea midline with no masses or thyromegaly  Heart:  RRR, no murmurs.  No edema noted, extremities warm and well perfused  Lungs:  clear to auscultation bilaterally with symmetric chest movement  Abdomen:  Soft, nontender, nondistended.  Normal bowel sounds  Musculoskeletal:  Normal gait.  Normal posture.  Normal muscular development with no joint swelling.  Neurological:  CN II-XII grossly intact. Symmetric strength and sensation  Psych:  Normal mood and affect.  Able to demonstrate good judgement and personal insight.      Assessment/Plan:    1. Anxiety    2. Lipid disorder    3. Bariatric surgery status    4. Type 2 diabetes mellitus without complication, without long-term current use of insulin    5. Dupuytren's disease of palm         Stable  Started statin for primary prevention  Stable  Check A1C  Stable with pain management  Visit today included increased complexity associated with the care of the episodic problem dm, htn, hld addressed and managing the " longitudinal care of the patient due to the serious and/or complex managed problem(s) dm, htn, hld.

## 2024-06-28 RX ORDER — DULOXETIN HYDROCHLORIDE 60 MG/1
60 CAPSULE, DELAYED RELEASE ORAL 2 TIMES DAILY
Qty: 180 CAPSULE | Refills: 1 | Status: SHIPPED | OUTPATIENT
Start: 2024-06-28

## 2024-06-28 NOTE — TELEPHONE ENCOUNTER
No care due was identified.  [facility] Embedded Care Due Messages. Reference number: [id number].   6/28/2024 1:31:35 PM CDT

## 2024-06-28 NOTE — TELEPHONE ENCOUNTER
Refill Decision Note   Macy Davidard  is requesting a refill authorization.  Brief Assessment and Rationale for Refill:        Medication Therapy Plan:         Comments:     Note composed:1:42 PM 06/28/2024

## 2024-08-18 NOTE — ANESTHESIA PREPROCEDURE EVALUATION
11/01/2022  Macy Mackay is a 72 y.o., female.      Pre-op Assessment    I have reviewed the Patient Summary Reports.     I have reviewed the Nursing Notes.    I have reviewed the Medications.     Review of Systems  Social:  Non-Smoker    Hematology/Oncology:  Hematology Normal   Oncology Normal     EENT/Dental:EENT/Dental Normal   Cardiovascular:  Cardiovascular Normal     Pulmonary:  Pulmonary Normal    Hepatic/GI:   GERD, well controlled    Musculoskeletal:  Spine Disorders: cervical and lumbar    Neurological:   Chronic Pain Syndrome   Psych:   anxiety depression          Physical Exam    Airway:  Mallampati: II   Mouth Opening: Normal  TM Distance: Normal  Tongue: Normal  Neck ROM: Normal ROM    Heart:  Rate: Normal  Rhythm: Regular Rhythm        Anesthesia Plan  Type of Anesthesia, risks & benefits discussed:    Anesthesia Type: Gen ETT, Gen Supraglottic Airway  Intra-op Monitoring Plan: Standard ASA Monitors  Post Op Pain Control Plan: multimodal analgesia  Induction:  IV  ASA Score: 2  Day of Surgery Review of History & Physical: H&P Update referred to the surgeon/provider.    Ready For Surgery From Anesthesia Perspective.     .       (V5) oriented

## 2024-11-22 DIAGNOSIS — Z78.0 MENOPAUSE: ICD-10-CM

## 2025-01-07 RX ORDER — DULOXETIN HYDROCHLORIDE 60 MG/1
CAPSULE, DELAYED RELEASE ORAL
Qty: 180 CAPSULE | Refills: 0 | Status: SHIPPED | OUTPATIENT
Start: 2025-01-07

## 2025-01-07 NOTE — TELEPHONE ENCOUNTER
Care Due:                  Date            Visit Type   Department     Provider  --------------------------------------------------------------------------------                                EP -                              PRIMARY      [facility]  Last Visit: 05-      CARE ([facility])   MEDICINE       SERVANDO SAAVEDRA  Next Visit: None Scheduled  None         None Found                                                            Last  Test          Frequency    Reason                     Performed    Due Date  --------------------------------------------------------------------------------    CMP.........  12 months..  DULoxetine, pravastatin..  10-   10-    Lipid Panel.  12 months..  pravastatin..............  10-   10-    [employer] Embedded Care Due Messages. Reference number: [id number].   1/06/2025 6:46:01 PM CST

## 2025-01-07 NOTE — TELEPHONE ENCOUNTER
Refill Routing Note   Medication(s) are not appropriate for processing by [facility] for the following reason(s):        Required labs outdated: CMP    [facility] action(s):  Defer   Requires labs : Yes - lipid panel outdated            Appointments  past 12m or future 3m with PCP    Date Provider   Last Visit   5/3/2024 Jose Miguel Gottlieb MD   Next Visit   Visit date not found Jose Miguel Gottlieb MD   ED visits in past 90 days: 0        Note composed:6:48 PM 01/06/2025

## 2025-04-03 ENCOUNTER — TELEPHONE (OUTPATIENT)
Dept: OBSTETRICS AND GYNECOLOGY | Facility: CLINIC | Age: 75
End: 2025-04-03
Payer: MEDICARE

## 2025-04-03 NOTE — PROGRESS NOTES
OBSTETRICS AND GYNECOLOGY    Subjective:      Chief Complaint: Bleeding     HPI:  Macy Mackay is an 74 y.o.  presenting with concerns of bleeding. Noted bright red blood in toilet bowel yesterday after voided. Stopped today. Was not painful. Had no idea there would be blood until looked. Associated suprapubic discomfort. Does feel like slightly more bloated than typical. No dysuria. No fevers. No abnormal vaginal discharge, odor, rash, or itch. Not sexually active. S/p hyst. Does not think from rectum. Denies feeling ill, denies unintentional weight loss. No additional complaints.     Review of systems:  Denies fever.     OB History    Para Term  AB Living   6 3 3  3 3   SAB IAB Ectopic Multiple Live Births   3    3      # Outcome Date GA Lbr Antonino/2nd Weight Sex Type Anes PTL Lv   6 Term      Vag-Spont   ALINA   5 Term      Vag-Spont   ALINA   4 Term      Vag-Spont   ALINA   3 SAB            2 SAB            1 SAB              Past Medical History:   Diagnosis Date    Anxiety     Breast disorder     Depression     Diabetes mellitus     Hyperlipidemia     Leiomyoma     Menopause     Osteopenia     Osteoporosis     in t section      Past Surgical History:   Procedure Laterality Date    ABDOMINAL SURGERY      gastric sleeve then gastric bypass then stomach reconstruction (after complications)    BREAST SURGERY      removed implants    CYST REMOVAL Right 2023    Right hand    EXCISION OF MASS OF HAND Right 11/10/2022    Procedure: Excision Tumor Right Wrist;  Surgeon: Ad Mascorro DO;  Location: [facility] OR;  Service: Orthopedics;  Laterality: Right;    HYSTERECTOMY      neck and back surgeries      OOPHORECTOMY       Family History   Problem Relation Name Age of Onset    Eclampsia Maternal Grandmother      Diabetes Mother      Hypertension Mother      Stroke Mother      Diabetes Brother      Diabetes Sister      Stroke Maternal Uncle      Breast cancer Neg Hx      Colon cancer Neg Hx       "Ovarian cancer Neg Hx         Allergies:   Review of patient's allergies indicates:   Allergen Reactions    Demerol [meperidine] Hives       Medications:   Current Medications[1]    Social History     Tobacco Use    Smoking status: Former     Types: Cigarettes     Passive exposure: Never    Smokeless tobacco: Never   Substance Use Topics    Alcohol use: No       Objective:   /73 (BP Location: Left arm, Patient Position: Sitting)   Ht 5' 5" (1.651 m)   Wt 60.1 kg (132 lb 7.9 oz)   BMI 22.05 kg/m²   Physical Exam    GENERAL: Alert, well dressed, well nourished. Appropriate mood and affect. Pleasant.  HEENT: Normocephalic, atraumatic. Extraocular movements intact. Hearing and vision grossly intact.   PULMONARY: No respiratory distress. No use of accessory muscles of respiration. No cyanosis. Speaking comfortably in full sentences.   CARDIAC: Well perfused.    ABDOMEN: Soft. Nonacute, non-distended. No rebound, guarding, or rigidity. Minimal suprapubic discomfort.  EXTREMITIES: No edema. No visible rashes.     PELVIC:   EXTERNAL GENITALIA: No lesions or masses, non-erythematous.  URETHRA: Patent, no discharge.   VAGINA: Pink, moist, rugated, no discharge. Minimal atrophy, no old blood, no ulcerations.  ADNEXA: Non-tender, non-palpable.    Assessment/Plan:     Reviewed ddx, including GYN and non-GYN etiologies  UA, urine culture  Urology consult  GI consult pending the above  S/p hyst  Patient reports confidence regarding oophorectomy, oophorectomy in chart as well, however no path; consider repeat ?pelvic US  Patient of Dr. Zhang, note to provider      As of April 1, 2021, the Cures Act has been passed nationally. This new law requires that all doctors progress notes, lab results, pathology reports and radiology reports be released IMMEDIATELY to the patient in the patient portal. That means that the results are released to you at the EXACT same time they are released to me. Therefore, with all of the " "patients that I have I am not able to reply to each patient exactly when the results come in. So there will be a delay from when you see the results to when I see them and have time to come up with a response to send you. Also I only see these results when I am on the computer at work. So if the results come in over the weekend or after 5 pm of a work day, I will not see them until the next business day. 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 So please be patient!   Thanks for your understanding and patience.         [1]   Current Outpatient Medications   Medication Sig Dispense Refill    baclofen (LIORESAL) 10 MG tablet Take 10 mg by mouth 3 (three) times daily.  1    BD LUER-ROSA SYRINGE 3 mL 25 gauge x 1" Syrg       cyanocobalamin 1,000 mcg/mL injection       DULoxetine (CYMBALTA) 60 MG capsule TAKE 1 CAPSULE BY MOUTH TWICE DAILY FOR MOOD AND NERVE PAIN 180 capsule 0    estradioL (ESTRACE) 1 MG tablet Take 1 tablet (1 mg total) by mouth once daily. TAKE 1 TABLET ONE TIME DAILY 30 tablet 6    fentaNYL (DURAGESIC) 50 mcg/hr APPLY 1 PATCH TRANSDERMALLY EVERY 72 HOURS  0    ibuprofen (ADVIL,MOTRIN) 800 MG tablet Take 800 mg by mouth every 6 (six) hours as needed.  0    melatonin 5 mg Tab Take 5 mg by mouth.      ondansetron (ZOFRAN-ODT) 4 MG TbDL       oxycodone (ROXICODONE) 15 MG Tab Take 15 mg by mouth 4 (four) times daily.      pantoprazole (PROTONIX) 40 MG tablet Take 1 tablet (40 mg total) by mouth once daily. 30 tablet 11    pravastatin (PRAVACHOL) 40 MG tablet Take 1 tablet (40 mg total) by mouth once daily. 90 tablet 3    primidone (MYSOLINE) 250 MG Tab Take 250 mg by mouth 3 (three) times daily.      quiNINE sulfate 324 mg Cap Take 1 capsule by mouth every evening. at bedtime 30 capsule 6    tretinoin (RETIN-A) 0.025 % cream 1 application every evening. Apply to face  3     No current facility-administered medications for this " visit.

## 2025-04-03 NOTE — TELEPHONE ENCOUNTER
"Pt last seen 2019 by Dr. Zhang.     Pt states toilet was full of blood after she voided but not sure where it is coming from.  Confirmed it is not rectal bleeding.  Denies urinary symptoms but says urine is delayed from coming out when sits on toilet.    Denies vaginal complaints.  H/o total hysterectomy.  Says about 30 minutes before seeing the bloody toilet water had some pelvic cramping "where uterus used to be".  Recommended an appt. Scheduled with Dr. Barry tomorrow.    "

## 2025-04-04 ENCOUNTER — OFFICE VISIT (OUTPATIENT)
Dept: OBSTETRICS AND GYNECOLOGY | Facility: CLINIC | Age: 75
End: 2025-04-04
Payer: MEDICARE

## 2025-04-04 VITALS
WEIGHT: 132.5 LBS | BODY MASS INDEX: 22.08 KG/M2 | DIASTOLIC BLOOD PRESSURE: 73 MMHG | HEIGHT: 65 IN | SYSTOLIC BLOOD PRESSURE: 115 MMHG

## 2025-04-04 DIAGNOSIS — R31.9 HEMATURIA, UNSPECIFIED TYPE: Primary | ICD-10-CM

## 2025-04-04 LAB
BILIRUB UR QL STRIP.AUTO: NEGATIVE
CLARITY UR: CLEAR
COLOR UR AUTO: YELLOW
GLUCOSE UR QL STRIP: NEGATIVE
HGB UR QL STRIP: NEGATIVE
KETONES UR QL STRIP: NEGATIVE
LEUKOCYTE ESTERASE UR QL STRIP: NEGATIVE
NITRITE UR QL STRIP: NEGATIVE
PH UR STRIP: 6 [PH]
PROT UR QL STRIP: NEGATIVE
SP GR UR STRIP: 1.01
UROBILINOGEN UR STRIP-ACNC: NEGATIVE EU/DL

## 2025-04-04 PROCEDURE — 81003 URINALYSIS AUTO W/O SCOPE: CPT | Performed by: STUDENT IN AN ORGANIZED HEALTH CARE EDUCATION/TRAINING PROGRAM

## 2025-04-04 PROCEDURE — 87086 URINE CULTURE/COLONY COUNT: CPT | Performed by: STUDENT IN AN ORGANIZED HEALTH CARE EDUCATION/TRAINING PROGRAM

## 2025-04-04 PROCEDURE — 99999 PR PBB SHADOW E&M-EST. PATIENT-LVL III: CPT | Mod: PBBFAC,,, | Performed by: STUDENT IN AN ORGANIZED HEALTH CARE EDUCATION/TRAINING PROGRAM

## (undated) DEVICE — SPONGE LAP 18X18 PREWASHED

## (undated) DEVICE — GLOVE SURG ULTRA TOUCH 8.5

## (undated) DEVICE — BANDAGE ESMARK ELASTIC ST 4X9

## (undated) DEVICE — TOURNIQUET SB QC SP 18X4IN

## (undated) DEVICE — SPLINT FIBERGLASS PAD 6X15

## (undated) DEVICE — PAD SUREFIT GRND ELECTRD 10FT

## (undated) DEVICE — ADHESIVE MASTISOL VIAL 48/BX

## (undated) DEVICE — PAD CAST SPECIALIST STRL 4

## (undated) DEVICE — GOWN POLY REINF BRTH SLV LG

## (undated) DEVICE — GLOVE GAMMEX SURG LF PI SZ 8

## (undated) DEVICE — SUT 4-0 ETHILON 18 PS-2

## (undated) DEVICE — PADDING CAST 4IN SPECIALIST

## (undated) DEVICE — GLOVE SURG ULTRA TOUCH 8

## (undated) DEVICE — SUT MONOCRYL 4-0 PS-2

## (undated) DEVICE — GLOVE SURG ULTRA TOUCH 7

## (undated) DEVICE — SUT 4-0 VICRYL / SH

## (undated) DEVICE — DRAPE U STD FILM LG 60X84IN

## (undated) DEVICE — CANISTER SUCTION 3000CC

## (undated) DEVICE — DRAPE STERI INSTRUMENT 1018

## (undated) DEVICE — Device

## (undated) DEVICE — DRAPE HAND STERILE

## (undated) DEVICE — SPONGE/BRUSH SCRUB SURG PCMX

## (undated) DEVICE — SPONGE GAUZE 16PLY 4X4

## (undated) DEVICE — UNDERGLOVES BIOGEL PI SIZE 7.5

## (undated) DEVICE — BLADE SURG #15 CARBON STEEL

## (undated) DEVICE — CLOSURE SKIN STERI STRIP 1/2X4

## (undated) DEVICE — LABEL FOR UTILITY MARKER

## (undated) DEVICE — GAUZE SPONGE 4X4 12PLY

## (undated) DEVICE — GLOVE SURGEONS ULTRA TOUCH 6.5

## (undated) DEVICE — GLOVE SURG ULTRA TOUCH 9

## (undated) DEVICE — SUT 3-0 VICRYL / SH (J416)

## (undated) DEVICE — SLING ARM COMFT NAVY BLU LG

## (undated) DEVICE — DRAPE STERI U-SHAPED 47X51IN

## (undated) DEVICE — DRAPE THREE-QUARTER 53X77IN

## (undated) DEVICE — NDL ELECTRODE E-Z CLEAN 2.75IN